# Patient Record
Sex: MALE | Race: WHITE | HISPANIC OR LATINO | Employment: OTHER | URBAN - METROPOLITAN AREA
[De-identification: names, ages, dates, MRNs, and addresses within clinical notes are randomized per-mention and may not be internally consistent; named-entity substitution may affect disease eponyms.]

---

## 2023-03-21 ENCOUNTER — DOCUMENTATION (OUTPATIENT)
Dept: PRIMARY CARE | Facility: CLINIC | Age: 68
End: 2023-03-21
Payer: MEDICARE

## 2023-03-21 ENCOUNTER — TELEPHONE (OUTPATIENT)
Dept: PRIMARY CARE | Facility: CLINIC | Age: 68
End: 2023-03-21
Payer: MEDICARE

## 2023-03-21 ENCOUNTER — PATIENT OUTREACH (OUTPATIENT)
Dept: CARE COORDINATION | Facility: CLINIC | Age: 68
End: 2023-03-21
Payer: MEDICARE

## 2023-03-21 NOTE — PROGRESS NOTES
Discharge Facility:[facility]   Discharge Diagnosis:Other intervertebral disc degeneration, lumbar region,  muscle weakness, unsteadiness on feet, falls  Admission Date:3/8/2023  Discharge Date:3/19/2023    Hospital: [facility] 3/5/2023-3/8/2023    PCP appt:Pending    Engagement  Call Start Time: 1050 (3/21/2023  2:05 PM)    Medications  Medications reviewed with patient/caregiver?: Not applicable (3/21/2023  2:05 PM)  Is the patient having any side effects they believe may be caused by any medication additions or changes?: No (3/21/2023  2:05 PM)  Does the patient have all medications ordered at discharge?: Not applicable (3/21/2023  2:05 PM)  Care Management Interventions: No intervention needed (3/21/2023  2:05 PM)  Is the patient taking all medications as directed (includes completed medication regime)?: Yes (3/21/2023  2:05 PM)    Appointments  Does the patient have a primary care provider?: Yes (3/21/2023  2:05 PM)  Care Management Interventions: Advised patient to make appointment (Wife prefers to schedule, needing multiple appointments) (3/21/2023  2:05 PM)  Has the patient kept scheduled appointments due by today?: Yes (3/21/2023  2:05 PM)    Self Management  What is the home health agency?: [facility] (3/21/2023  2:05 PM)  Has home health visited the patient within 72 hours of discharge?: Call prior to 72 hours (OT Coming today) (3/21/2023  2:05 PM)    Patient Teaching  Does the patient have access to their discharge instructions?: Yes (3/21/2023  2:05 PM)  What is the patient's perception of their health status since discharge?: Improving (Spoke with wife Berta who states Jabari is doing great! Made a sandwich and did some laundry yesterday. No problems with gait or falls since home.) (3/21/2023  2:05 PM)  Is the patient/caregiver able to teach back the hierarchy of who to call/visit for symptoms/problems? PCP, Specialist, Home Health nurse, Urgent Care, ED, 911: Yes (3/21/2023  2:05  PM)    Wrap Up  Call End Time: 1054 (3/21/2023  2:05 PM)

## 2023-03-21 NOTE — TELEPHONE ENCOUNTER
Eliana from [facility] is calling for verbal order for skilled nursing, pt and ot - please call and advise [phone]

## 2023-03-25 PROBLEM — Z99.89 WALKER AS AMBULATION AID: Status: ACTIVE | Noted: 2023-03-25

## 2023-03-25 PROBLEM — R25.2 SPASTICITY: Status: ACTIVE | Noted: 2023-03-25

## 2023-03-25 PROBLEM — R10.11 ABDOMINAL PAIN, RUQ (RIGHT UPPER QUADRANT): Status: ACTIVE | Noted: 2023-03-25

## 2023-03-25 PROBLEM — E55.9 VITAMIN D DEFICIENCY: Status: ACTIVE | Noted: 2023-03-25

## 2023-03-25 PROBLEM — R09.89 BRUIT: Status: ACTIVE | Noted: 2023-03-25

## 2023-03-25 PROBLEM — R19.5 LOOSE BOWEL MOVEMENTS: Status: ACTIVE | Noted: 2023-03-25

## 2023-03-25 PROBLEM — L03.115 CELLULITIS OF RIGHT LOWER EXTREMITY: Status: ACTIVE | Noted: 2023-03-25

## 2023-03-25 PROBLEM — N28.1 RENAL CYST: Status: ACTIVE | Noted: 2023-03-25

## 2023-03-25 PROBLEM — N20.0 NEPHROLITHIASIS: Status: ACTIVE | Noted: 2023-03-25

## 2023-03-25 PROBLEM — E29.1 HYPOGONADISM MALE: Status: ACTIVE | Noted: 2023-03-25

## 2023-03-25 PROBLEM — R93.89 ABNORMAL CT SCAN: Status: ACTIVE | Noted: 2023-03-25

## 2023-03-25 PROBLEM — B35.3 TINEA PEDIS: Status: ACTIVE | Noted: 2023-03-25

## 2023-03-25 PROBLEM — K63.5 COLON POLYPS: Status: ACTIVE | Noted: 2023-03-25

## 2023-03-25 PROBLEM — J31.0 CHRONIC RHINITIS: Status: ACTIVE | Noted: 2023-03-25

## 2023-03-25 PROBLEM — R93.89 ABNORMAL CHEST XRAY: Status: ACTIVE | Noted: 2023-03-25

## 2023-03-25 PROBLEM — G20.A1 PARKINSON'S DISEASE (MULTI): Status: ACTIVE | Noted: 2023-03-25

## 2023-03-25 PROBLEM — E78.5 DYSLIPIDEMIA: Status: ACTIVE | Noted: 2023-03-25

## 2023-03-25 PROBLEM — R32 URINARY INCONTINENCE: Status: ACTIVE | Noted: 2023-03-25

## 2023-03-25 PROBLEM — R39.15 URINARY URGENCY: Status: ACTIVE | Noted: 2023-03-25

## 2023-03-25 PROBLEM — N40.0 BENIGN ENLARGEMENT OF PROSTATE: Status: ACTIVE | Noted: 2023-03-25

## 2023-03-25 PROBLEM — I89.0 LYMPHEDEMA: Status: ACTIVE | Noted: 2023-03-25

## 2023-03-25 PROBLEM — J45.909 REACTIVE AIRWAY DISEASE (HHS-HCC): Status: ACTIVE | Noted: 2023-03-25

## 2023-03-25 PROBLEM — I10 ESSENTIAL HYPERTENSION WITH GOAL BLOOD PRESSURE LESS THAN 140/90: Status: ACTIVE | Noted: 2023-03-25

## 2023-03-25 PROBLEM — I48.0 PAROXYSMAL ATRIAL FIBRILLATION (MULTI): Status: ACTIVE | Noted: 2023-03-25

## 2023-03-25 PROBLEM — R73.03 PREDIABETES: Status: ACTIVE | Noted: 2023-03-25

## 2023-03-25 PROBLEM — R60.0 EDEMA OF UPPER EXTREMITY: Status: ACTIVE | Noted: 2023-03-25

## 2023-03-25 PROBLEM — Z97.3 WEARS GLASSES: Status: ACTIVE | Noted: 2023-03-25

## 2023-03-25 PROBLEM — L30.9 DERMATITIS: Status: ACTIVE | Noted: 2023-03-25

## 2023-03-25 PROBLEM — E03.9 HYPOTHYROIDISM: Status: ACTIVE | Noted: 2023-03-25

## 2023-03-25 PROBLEM — R20.0 LEFT UPPER EXTREMITY NUMBNESS: Status: ACTIVE | Noted: 2023-03-25

## 2023-03-25 PROBLEM — I25.10 ATHEROSCLEROSIS OF CORONARY ARTERY OF NATIVE HEART: Status: ACTIVE | Noted: 2023-03-25

## 2023-03-25 PROBLEM — E78.5 HYPERLIPIDEMIA: Status: ACTIVE | Noted: 2023-03-25

## 2023-03-25 PROBLEM — R73.01 ELEVATED FASTING GLUCOSE: Status: ACTIVE | Noted: 2023-03-25

## 2023-03-25 PROBLEM — I87.329 STASIS EDEMA WITH INFLAMMATION: Status: ACTIVE | Noted: 2023-03-25

## 2023-03-25 PROBLEM — N18.9 CHRONIC KIDNEY DISEASE: Status: ACTIVE | Noted: 2023-03-25

## 2023-03-25 PROBLEM — J32.9 SINUSITIS: Status: ACTIVE | Noted: 2023-03-25

## 2023-03-25 PROBLEM — M79.609: Status: ACTIVE | Noted: 2023-03-25

## 2023-03-25 PROBLEM — R53.83 FATIGUE: Status: ACTIVE | Noted: 2023-03-25

## 2023-03-25 PROBLEM — R21 SKIN RASH: Status: ACTIVE | Noted: 2023-03-25

## 2023-03-25 PROBLEM — M79.89: Status: ACTIVE | Noted: 2023-03-25

## 2023-03-25 PROBLEM — G54.0 THORACIC OUTLET SYNDROME: Status: ACTIVE | Noted: 2023-03-25

## 2023-03-25 PROBLEM — G81.91 HEMIPARESIS, RIGHT (MULTI): Status: ACTIVE | Noted: 2023-03-25

## 2023-03-25 PROBLEM — R26.0 ATAXIC GAIT: Status: ACTIVE | Noted: 2023-03-25

## 2023-03-25 PROBLEM — M25.519 SHOULDER PAIN: Status: ACTIVE | Noted: 2023-03-25

## 2023-03-25 PROBLEM — E66.3 OVERWEIGHT: Status: ACTIVE | Noted: 2023-03-25

## 2023-03-25 RX ORDER — FUROSEMIDE 20 MG/1
1 TABLET ORAL
COMMUNITY
Start: 2015-12-16 | End: 2023-03-31 | Stop reason: DRUGHIGH

## 2023-03-25 RX ORDER — ERGOCALCIFEROL 1.25 MG/1
50000 CAPSULE ORAL
COMMUNITY
Start: 2011-09-24 | End: 2023-11-27 | Stop reason: SDUPTHER

## 2023-03-25 RX ORDER — FLUOCINOLONE ACETONIDE 0.11 MG/ML
OIL TOPICAL
COMMUNITY
Start: 2021-06-22

## 2023-03-25 RX ORDER — METOPROLOL SUCCINATE 25 MG/1
12.5 TABLET, EXTENDED RELEASE ORAL EVERY MORNING
COMMUNITY
Start: 2021-10-07 | End: 2024-01-05 | Stop reason: ALTCHOICE

## 2023-03-25 RX ORDER — FOLIC ACID 1 MG/1
1 TABLET ORAL DAILY
COMMUNITY
Start: 2018-11-12 | End: 2023-09-14

## 2023-03-25 RX ORDER — PRAVASTATIN SODIUM 20 MG/1
20 TABLET ORAL EVERY EVENING
COMMUNITY
Start: 2013-02-14 | End: 2023-09-29 | Stop reason: SDUPTHER

## 2023-03-25 RX ORDER — APIXABAN 5 MG/1
5 TABLET, FILM COATED ORAL 2 TIMES DAILY
COMMUNITY

## 2023-03-25 RX ORDER — TAMSULOSIN HYDROCHLORIDE 0.4 MG/1
0.4 CAPSULE ORAL EVERY EVENING
COMMUNITY
Start: 2019-08-27 | End: 2023-09-15 | Stop reason: SDUPTHER

## 2023-03-25 RX ORDER — CETIRIZINE HYDROCHLORIDE 10 MG/1
10 TABLET ORAL DAILY
COMMUNITY
Start: 2020-07-08 | End: 2024-01-05 | Stop reason: DRUGHIGH

## 2023-03-25 RX ORDER — FLUTICASONE PROPIONATE 50 MCG
2 SPRAY, SUSPENSION (ML) NASAL EVERY EVENING
COMMUNITY
Start: 2020-07-08 | End: 2024-01-05 | Stop reason: DRUGHIGH

## 2023-03-25 RX ORDER — LEVOTHYROXINE SODIUM 25 UG/1
TABLET ORAL
COMMUNITY
Start: 2013-07-15 | End: 2023-06-12 | Stop reason: SDUPTHER

## 2023-03-25 RX ORDER — LOSARTAN POTASSIUM 50 MG/1
50 TABLET ORAL 2 TIMES DAILY
COMMUNITY
Start: 2015-03-03 | End: 2023-09-15 | Stop reason: SDUPTHER

## 2023-03-25 RX ORDER — AMLODIPINE BESYLATE 10 MG/1
1 TABLET ORAL DAILY
COMMUNITY
Start: 2018-11-13 | End: 2024-01-05 | Stop reason: SDUPTHER

## 2023-03-25 RX ORDER — CLOBETASOL PROPIONATE 0.05 G/100ML
SHAMPOO TOPICAL
COMMUNITY

## 2023-03-25 RX ORDER — CARBIDOPA AND LEVODOPA 25; 100 MG/1; MG/1
2 TABLET ORAL 3 TIMES DAILY
COMMUNITY
Start: 2013-02-21 | End: 2023-09-15 | Stop reason: SDUPTHER

## 2023-03-25 RX ORDER — UBIDECARENONE 75 MG
1 CAPSULE ORAL DAILY
COMMUNITY
Start: 2018-11-12

## 2023-03-30 ENCOUNTER — APPOINTMENT (OUTPATIENT)
Dept: PRIMARY CARE | Facility: CLINIC | Age: 68
End: 2023-03-30
Payer: MEDICARE

## 2023-03-31 ENCOUNTER — OFFICE VISIT (OUTPATIENT)
Dept: PRIMARY CARE | Facility: CLINIC | Age: 68
End: 2023-03-31
Payer: MEDICARE

## 2023-03-31 VITALS
RESPIRATION RATE: 16 BRPM | HEART RATE: 55 BPM | TEMPERATURE: 98.5 F | DIASTOLIC BLOOD PRESSURE: 71 MMHG | OXYGEN SATURATION: 96 % | SYSTOLIC BLOOD PRESSURE: 150 MMHG

## 2023-03-31 DIAGNOSIS — Z89.432 STATUS POST AMPUTATION OF LEFT FOOT THROUGH METATARSAL BONE (MULTI): Primary | ICD-10-CM

## 2023-03-31 DIAGNOSIS — I73.9 PVD (PERIPHERAL VASCULAR DISEASE) (CMS-HCC): ICD-10-CM

## 2023-03-31 DIAGNOSIS — G81.91 HEMIPARESIS, RIGHT (MULTI): ICD-10-CM

## 2023-03-31 DIAGNOSIS — R60.9 EDEMA, UNSPECIFIED TYPE: ICD-10-CM

## 2023-03-31 DIAGNOSIS — I48.0 PAROXYSMAL ATRIAL FIBRILLATION (MULTI): ICD-10-CM

## 2023-03-31 LAB
ALBUMIN (G/DL) IN SER/PLAS: 4 G/DL (ref 3.4–5)
ANION GAP IN SER/PLAS: 10 MMOL/L (ref 10–20)
APPEARANCE, URINE: CLEAR
BASOPHILS (10*3/UL) IN BLOOD BY AUTOMATED COUNT: 0.03 X10E9/L (ref 0–0.1)
BASOPHILS/100 LEUKOCYTES IN BLOOD BY AUTOMATED COUNT: 0.5 % (ref 0–2)
BILIRUBIN, URINE: NEGATIVE
BLOOD, URINE: NEGATIVE
CALCIDIOL (25 OH VITAMIN D3) (NG/ML) IN SER/PLAS: 26 NG/ML
CALCIUM (MG/DL) IN SER/PLAS: 9.3 MG/DL (ref 8.6–10.3)
CARBON DIOXIDE, TOTAL (MMOL/L) IN SER/PLAS: 28 MMOL/L (ref 21–32)
CHLORIDE (MMOL/L) IN SER/PLAS: 106 MMOL/L (ref 98–107)
COLOR, URINE: YELLOW
CREATININE (MG/DL) IN SER/PLAS: 0.76 MG/DL (ref 0.5–1.3)
CREATININE (MG/DL) IN URINE: 71.2 MG/DL (ref 20–370)
EOSINOPHILS (10*3/UL) IN BLOOD BY AUTOMATED COUNT: 0.18 X10E9/L (ref 0–0.7)
EOSINOPHILS/100 LEUKOCYTES IN BLOOD BY AUTOMATED COUNT: 3.2 % (ref 0–6)
ERYTHROCYTE DISTRIBUTION WIDTH (RATIO) BY AUTOMATED COUNT: 15.1 % (ref 11.5–14.5)
ERYTHROCYTE MEAN CORPUSCULAR HEMOGLOBIN CONCENTRATION (G/DL) BY AUTOMATED: 31.8 G/DL (ref 32–36)
ERYTHROCYTE MEAN CORPUSCULAR VOLUME (FL) BY AUTOMATED COUNT: 88 FL (ref 80–100)
ERYTHROCYTES (10*6/UL) IN BLOOD BY AUTOMATED COUNT: 4.58 X10E12/L (ref 4.5–5.9)
GFR MALE: >90 ML/MIN/1.73M2
GLUCOSE (MG/DL) IN SER/PLAS: 96 MG/DL (ref 74–99)
GLUCOSE, URINE: NEGATIVE MG/DL
HEMATOCRIT (%) IN BLOOD BY AUTOMATED COUNT: 40.2 % (ref 41–52)
HEMOGLOBIN (G/DL) IN BLOOD: 12.8 G/DL (ref 13.5–17.5)
IMMATURE GRANULOCYTES/100 LEUKOCYTES IN BLOOD BY AUTOMATED COUNT: 0.2 % (ref 0–0.9)
KETONES, URINE: NEGATIVE MG/DL
LEUKOCYTE ESTERASE, URINE: NEGATIVE
LEUKOCYTES (10*3/UL) IN BLOOD BY AUTOMATED COUNT: 5.7 X10E9/L (ref 4.4–11.3)
LYMPHOCYTES (10*3/UL) IN BLOOD BY AUTOMATED COUNT: 1.37 X10E9/L (ref 1.2–4.8)
LYMPHOCYTES/100 LEUKOCYTES IN BLOOD BY AUTOMATED COUNT: 24.1 % (ref 13–44)
MAGNESIUM (MG/DL) IN SER/PLAS: 2.12 MG/DL (ref 1.6–2.4)
MONOCYTES (10*3/UL) IN BLOOD BY AUTOMATED COUNT: 0.47 X10E9/L (ref 0.1–1)
MONOCYTES/100 LEUKOCYTES IN BLOOD BY AUTOMATED COUNT: 8.3 % (ref 2–10)
MUCUS, URINE: NORMAL /LPF
NEUTROPHILS (10*3/UL) IN BLOOD BY AUTOMATED COUNT: 3.62 X10E9/L (ref 1.2–7.7)
NEUTROPHILS/100 LEUKOCYTES IN BLOOD BY AUTOMATED COUNT: 63.7 % (ref 40–80)
NITRITE, URINE: NEGATIVE
PH, URINE: 7 (ref 5–8)
PHOSPHATE (MG/DL) IN SER/PLAS: 2.9 MG/DL (ref 2.5–4.9)
PLATELETS (10*3/UL) IN BLOOD AUTOMATED COUNT: 177 X10E9/L (ref 150–450)
POTASSIUM (MMOL/L) IN SER/PLAS: 4.1 MMOL/L (ref 3.5–5.3)
PROTEIN (MG/DL) IN URINE: 11 MG/DL (ref 5–25)
PROTEIN, URINE: NEGATIVE MG/DL
PROTEIN/CREATININE (MG/MG) IN URINE: 0.15 MG/MG CREAT (ref 0–0.17)
RBC, URINE: 1 /HPF (ref 0–5)
SODIUM (MMOL/L) IN SER/PLAS: 140 MMOL/L (ref 136–145)
SPECIFIC GRAVITY, URINE: 1.01 (ref 1–1.03)
SQUAMOUS EPITHELIAL CELLS, URINE: <1 /HPF
URATE (MG/DL) IN SER/PLAS: 6.2 MG/DL (ref 4–7.5)
UREA NITROGEN (MG/DL) IN SER/PLAS: 16 MG/DL (ref 6–23)
UROBILINOGEN, URINE: <2 MG/DL (ref 0–1.9)
WBC, URINE: 2 /HPF (ref 0–5)

## 2023-03-31 PROCEDURE — 1159F MED LIST DOCD IN RCRD: CPT | Performed by: INTERNAL MEDICINE

## 2023-03-31 PROCEDURE — 99214 OFFICE O/P EST MOD 30 MIN: CPT | Performed by: INTERNAL MEDICINE

## 2023-03-31 PROCEDURE — 3077F SYST BP >= 140 MM HG: CPT | Performed by: INTERNAL MEDICINE

## 2023-03-31 PROCEDURE — 3078F DIAST BP <80 MM HG: CPT | Performed by: INTERNAL MEDICINE

## 2023-03-31 PROCEDURE — 1036F TOBACCO NON-USER: CPT | Performed by: INTERNAL MEDICINE

## 2023-03-31 RX ORDER — FUROSEMIDE 20 MG/1
20 TABLET ORAL 2 TIMES DAILY
Qty: 180 TABLET | Refills: 3 | Status: SHIPPED | OUTPATIENT
Start: 2023-03-31 | End: 2023-09-07 | Stop reason: SDUPTHER

## 2023-03-31 RX ORDER — FUROSEMIDE 20 MG/1
20 TABLET ORAL 2 TIMES DAILY
Qty: 180 TABLET | Refills: 3 | Status: SHIPPED | OUTPATIENT
Start: 2023-03-31 | End: 2023-03-31 | Stop reason: SDUPTHER

## 2023-03-31 ASSESSMENT — ENCOUNTER SYMPTOMS
LOSS OF SENSATION IN FEET: 1
OCCASIONAL FEELINGS OF UNSTEADINESS: 1
DEPRESSION: 0

## 2023-03-31 ASSESSMENT — PATIENT HEALTH QUESTIONNAIRE - PHQ9
2. FEELING DOWN, DEPRESSED OR HOPELESS: NOT AT ALL
1. LITTLE INTEREST OR PLEASURE IN DOING THINGS: NOT AT ALL
SUM OF ALL RESPONSES TO PHQ9 QUESTIONS 1 AND 2: 0

## 2023-03-31 NOTE — PROGRESS NOTES
Subjective   Patient ID: Jabari Swan is a 68 y.o. male who presents for Hospital Follow-up.    Here with his wife Berta after a recent ER visit 3/5/2023 he was admitted for 3 days and then discharged to rehab March 8, until March 19.  He has been home since then    Overall doing well all things considered.  No falls.  He is doing exercises at home         Review of Systems    Objective   /71 (BP Location: Left arm, Patient Position: Sitting)   Pulse 55   Temp 36.9 °C (98.5 °F)   Resp 16   SpO2 96%     Physical Exam  Constitutional:       Appearance: He is obese.      Comments: Very pleasant gentleman, in a wheelchair no distress, right hemiparesis   HENT:      Head: Normocephalic and atraumatic.   Eyes:      Extraocular Movements: Extraocular movements intact.      Conjunctiva/sclera: Conjunctivae normal.      Pupils: Pupils are equal, round, and reactive to light.   Cardiovascular:      Rate and Rhythm: Normal rate and regular rhythm.      Pulses: Normal pulses.      Heart sounds: Normal heart sounds.      No gallop.   Pulmonary:      Effort: Pulmonary effort is normal.      Breath sounds: Normal breath sounds. No wheezing or rhonchi.   Musculoskeletal:      Cervical back: Normal range of motion and neck supple. No tenderness.      Right lower leg: Edema present.      Left lower leg: Edema present.   Lymphadenopathy:      Cervical: No cervical adenopathy.   Neurological:      Mental Status: He is alert.      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  At home again.  He will continue ongoing home exercises and therapy as needed fortunately improved     P. A-Fib/Arrhythmia - cardiologist, Dr. Foley has started metoprolol 25 mg one time a day. No dizziness.  Stable clinically from a rhythm standpoint. He will continue his medications and cardiology follow-up            Stable, saw Dr. Foley Nov '22.     Pulmonary hypertension - echo April 2022 did show mild to moderate pulmonary hypertension but his cardiologist, did not feel that this was an issue. Otherwise normal EF without worrisome valvular findings on that echo     Bradycardia - discussed with wife in detail-although his heart rate gets into the high 40 range sometime he is not symptomatic. They reviewed this with Dr. Carnes who suggested reducing metoprolol succinate 25-1/2 tablet daily. Pulse rate has increased from the high 40s up into the 50 range. Recently between 55-64     Hypertension/chronic kidney disease-he will continue medications and nephrology follow-up with Dr. Sahu's successor, Dr. Contreras. He'll see her semiannually.             They will follow-up with Dr. Contreras semiannually-September and March.     Stasis dermatitis longstanding lymphedema/stasis cellulitis concerns - I think presently there is no signs of cellulitis today nor are there any open ulcers on the right calf. They will use the dressing during the day with the compression hose in place but then leave this off at night. They will discontinue the mupirocin with concerns of possible contact dermatitis as they have had issues with other antibiotic ointments and adhesive tapes   Improved presently. No recent concerns. He is on a stronger compression-he is able to put his socks on himself each morning. Uses  triamcinolone cream and CeraVe -as recommended by the dermatologist, Dr. Villalobos who they will see as needed        Edema - stable, compression hose daily, also uses a pump in the evening, continue Lasix as directed - this was recently decreased from twice daily by nephrology. Patient encouraged to get a scale so that he can monitor weight at home and keep log.    His wife states that his nephrologist suggested reducing the furosemide a bit-they will take 1 tablet Monday Wednesday Friday, 2 tablets on the other days and start doing weights at home     Parkinson's - not problematic, without noticeable tremor with medications per Dr. Peters in neurology- just saw last month; cont. semiannual visits. Overall doing well from this standpoint. To see Dr. Peters semiannually. Tremor not obvious today and exam. Doing well presently. semiannual visits planned. Tolerating medications. No recent concerns stable course.   He will see Dr. Lyle annually -each October. Clinically stable     Remote intracranial bleed/right-sided hemiparesis/gait unsteadiness - stable course. He's functionally independent for the most part at home. His wife continues to be extremely supportive accordingly. He remained stable fortunately   He will continue caution ambulating and continue exercises and stretches to maintain strength stamina as well as ambulation conditioning. He uses a walker at home typically fortunately no recent falls. Physical therapy continues on an as-needed basis     Hypothyroidism - we'll continue to follow thyroid labs semi-annually   Sept '22 TSH normal, continue present regimen.     Prediabetes - monitoring on semiannual basis   Jan '22, A1c 6.1%   Sept '22, A1c 6.1%, stable from prior, discussed weight loss efforts today.      Dyslipidemia/elevated weight - he will continue medications accordingly and weight loss efforts. Fasting labs will be followed at least annually   July 2021 lipids improved   Sept 2022 lipids slightly  "elevated but overall stable. Health lifestyle measures encouraged. He will continue to follow-up with cardiology (Dr. Foley) accordingly.     History of Recurrent pneumonia/with hospitalization again with another rehabilitation stay-    Improved presently no shortness of breath. Occas cough. Using incentive spirometer a 3 times daily        History of recurrent Left foot cellulitis /multiple amputations- he will try to keep this open to the air during the day to help keep this dry between the toes. He will see his podiatrist, Dr. Thurston as planned, no recent problems with consistent care     Urinary incontinence/BPH/nephrolithiasis/hypogonadism will continue medications per urology - He follows with Dr. Srivastava in urology--    Vitamin D deficiency - encouraged patient to continue vitamin D daily as ordered. Will consider vitamin D level regularly.   Sept '22 vitamin D stable/normal.     Colon care/colon polyps - as above colonoscopy planned routinely at some point with Dr. Camacho off the Eliquis   He had multiple tubular adenomatous polyps on his 12/21 colonoscopy. Next in 3 years-December 2024.     Foot care/status post right toe amputations - there is no evidence of any left toe infections today. He will continue with Dr. Groves in podiatry every 6 weeks. Appt yesterday \"everything fine\"    He will see Dr. Groves every 3 months or so-Aquaphor is used-Next appointment January 2023        Skin care/scalp psoriasis/eczema - per Dr. Amaya annually or as needed. UTD.      Dental care - continue every 6 months     Vision care/bilateral cataract/glasses - annual visits continue. He will see Dr. Riley August 2021 to address his bilateral cataracts.           At this point the cataracts are not ready to be removed. They will continue to see her regularly        Flu shot - each fall after Labor Day encouraged    Follow-up in 4 months, sooner as needed  "

## 2023-04-20 ENCOUNTER — DOCUMENTATION (OUTPATIENT)
Dept: PRIMARY CARE | Facility: CLINIC | Age: 68
End: 2023-04-20
Payer: MEDICARE

## 2023-04-27 ENCOUNTER — TELEPHONE (OUTPATIENT)
Dept: PRIMARY CARE | Facility: CLINIC | Age: 68
End: 2023-04-27
Payer: MEDICARE

## 2023-04-27 NOTE — TELEPHONE ENCOUNTER
Tabitha/[employer]/There are two outstanding orders that need to be signed off on, so patient can get them billed out to insurance. Patient has two outstanding orders for: Frequency change order and Face to Face verification. Please advise if these can be signed off today at: Tabitha/PH: [phone].

## 2023-05-01 NOTE — TELEPHONE ENCOUNTER
Called today to advise the orders have been faxed please have Dr. Rivera sign and return today so pt doesn't get a bill

## 2023-06-12 DIAGNOSIS — E03.9 ACQUIRED HYPOTHYROIDISM: Primary | ICD-10-CM

## 2023-06-12 RX ORDER — LEVOTHYROXINE SODIUM 25 UG/1
25 TABLET ORAL
Qty: 90 TABLET | Refills: 1 | Status: SHIPPED | OUTPATIENT
Start: 2023-06-12 | End: 2023-09-15 | Stop reason: SDUPTHER

## 2023-07-10 ENCOUNTER — TELEPHONE (OUTPATIENT)
Dept: PRIMARY CARE | Facility: CLINIC | Age: 68
End: 2023-07-10
Payer: MEDICARE

## 2023-07-10 NOTE — TELEPHONE ENCOUNTER
Patient's wife called / states  was seen at [place] ED on 7/7 for cellulitis. Wife states he needs an appt with Dr Nicole lopez.   Advised that I would send message to provider.     Please advise.....

## 2023-07-10 NOTE — TELEPHONE ENCOUNTER
Spoke with patient's spouse regarding his leg swelling, chronic problem and the redness.  He was started on Keflex Friday in the ER and she states that it may be a bit better.  Discussed his tendency towards swelling, a long-term issue.  He did see Dr. Gandhi years ago in the vascular clinic.  He has since retired.  I suggested he call the vascular clinic to set up an appointment with Dr. Gandhi successor to see if any vein testing or treatment needs to be considered to prevent this chronic stasis issue from recurring which leads to chronic stasis dermatitis and sometimes stasis cellulitis.    They will follow-up with me later this month as scheduled on July 27

## 2023-07-27 ENCOUNTER — OFFICE VISIT (OUTPATIENT)
Dept: PRIMARY CARE | Facility: CLINIC | Age: 68
End: 2023-07-27
Payer: MEDICARE

## 2023-07-27 VITALS
OXYGEN SATURATION: 94 % | DIASTOLIC BLOOD PRESSURE: 69 MMHG | TEMPERATURE: 98.1 F | HEART RATE: 57 BPM | RESPIRATION RATE: 16 BRPM | SYSTOLIC BLOOD PRESSURE: 129 MMHG

## 2023-07-27 DIAGNOSIS — I89.0 LYMPHEDEMA: ICD-10-CM

## 2023-07-27 DIAGNOSIS — I87.2 VENOUS INSUFFICIENCY (CHRONIC) (PERIPHERAL): ICD-10-CM

## 2023-07-27 DIAGNOSIS — E78.5 DYSLIPIDEMIA: ICD-10-CM

## 2023-07-27 DIAGNOSIS — R73.03 PREDIABETES: Primary | ICD-10-CM

## 2023-07-27 DIAGNOSIS — I10 ESSENTIAL HYPERTENSION WITH GOAL BLOOD PRESSURE LESS THAN 140/90: ICD-10-CM

## 2023-07-27 DIAGNOSIS — D12.6 ADENOMATOUS POLYP OF COLON, UNSPECIFIED PART OF COLON: ICD-10-CM

## 2023-07-27 DIAGNOSIS — I73.9 PVD (PERIPHERAL VASCULAR DISEASE) (CMS-HCC): ICD-10-CM

## 2023-07-27 DIAGNOSIS — E03.9 ACQUIRED HYPOTHYROIDISM: ICD-10-CM

## 2023-07-27 PROBLEM — S30.1XXA PSOAS HEMATOMA, RIGHT, SECONDARY TO ANTICOAGULANT THERAPY: Status: ACTIVE | Noted: 2018-11-28

## 2023-07-27 PROBLEM — E44.1 MALNUTRITION OF MILD DEGREE (MULTI): Status: ACTIVE | Noted: 2018-11-30

## 2023-07-27 PROBLEM — R29.90 STROKE-LIKE SYMPTOMS: Status: ACTIVE | Noted: 2022-04-13

## 2023-07-27 PROBLEM — M53.9 ACUTE LOW BACK PAIN DUE TO SPINAL DISORDER: Status: ACTIVE | Noted: 2018-11-22

## 2023-07-27 PROBLEM — J06.9 VIRAL URI: Status: ACTIVE | Noted: 2021-09-11

## 2023-07-27 PROBLEM — J18.9 PNEUMONIA: Status: ACTIVE | Noted: 2017-12-27

## 2023-07-27 PROBLEM — E44.1 MALNUTRITION OF MILD DEGREE (MULTI): Status: RESOLVED | Noted: 2018-11-30 | Resolved: 2023-07-27

## 2023-07-27 PROBLEM — S32.009A CLOSED FRACTURE OF LUMBAR VERTEBRA (MULTI): Status: ACTIVE | Noted: 2018-11-28

## 2023-07-27 PROBLEM — M54.50 ACUTE LOW BACK PAIN DUE TO SPINAL DISORDER: Status: ACTIVE | Noted: 2018-11-22

## 2023-07-27 PROBLEM — R19.7 DIARRHEA: Status: ACTIVE | Noted: 2021-01-24

## 2023-07-27 PROBLEM — R42 LIGHTHEADEDNESS: Status: ACTIVE | Noted: 2021-01-23

## 2023-07-27 PROCEDURE — 1160F RVW MEDS BY RX/DR IN RCRD: CPT | Performed by: INTERNAL MEDICINE

## 2023-07-27 PROCEDURE — 3078F DIAST BP <80 MM HG: CPT | Performed by: INTERNAL MEDICINE

## 2023-07-27 PROCEDURE — 99214 OFFICE O/P EST MOD 30 MIN: CPT | Performed by: INTERNAL MEDICINE

## 2023-07-27 PROCEDURE — 3074F SYST BP LT 130 MM HG: CPT | Performed by: INTERNAL MEDICINE

## 2023-07-27 PROCEDURE — 1159F MED LIST DOCD IN RCRD: CPT | Performed by: INTERNAL MEDICINE

## 2023-07-27 PROCEDURE — 1036F TOBACCO NON-USER: CPT | Performed by: INTERNAL MEDICINE

## 2023-07-27 RX ORDER — METOPROLOL TARTRATE 25 MG/1
TABLET, FILM COATED ORAL EVERY 24 HOURS
COMMUNITY
End: 2023-07-27 | Stop reason: ALTCHOICE

## 2023-07-27 RX ORDER — OXYBUTYNIN CHLORIDE 10 MG/1
TABLET, EXTENDED RELEASE ORAL EVERY 24 HOURS
COMMUNITY
End: 2023-07-27 | Stop reason: ALTCHOICE

## 2023-07-27 RX ORDER — HYDRALAZINE HYDROCHLORIDE 25 MG/1
TABLET, FILM COATED ORAL
COMMUNITY
End: 2023-07-27 | Stop reason: ALTCHOICE

## 2023-07-27 RX ORDER — TRIAMCINOLONE ACETONIDE 1 MG/G
CREAM TOPICAL 2 TIMES DAILY
COMMUNITY
End: 2024-03-11 | Stop reason: SDUPTHER

## 2023-07-27 RX ORDER — MUPIROCIN 20 MG/G
1 OINTMENT TOPICAL 3 TIMES DAILY
COMMUNITY
End: 2023-07-27 | Stop reason: ALTCHOICE

## 2023-07-27 RX ORDER — CEPHALEXIN 500 MG/1
CAPSULE ORAL
COMMUNITY
Start: 2023-07-07 | End: 2023-07-27 | Stop reason: ALTCHOICE

## 2023-07-27 RX ORDER — MIRABEGRON 50 MG/1
TABLET, EXTENDED RELEASE ORAL EVERY 24 HOURS
COMMUNITY
End: 2023-07-27 | Stop reason: ALTCHOICE

## 2023-07-27 RX ORDER — TACROLIMUS 1 MG/G
OINTMENT TOPICAL
COMMUNITY
Start: 2023-07-21 | End: 2023-07-27 | Stop reason: ALTCHOICE

## 2023-07-27 RX ORDER — CARVEDILOL 25 MG/1
TABLET ORAL EVERY 12 HOURS
COMMUNITY
End: 2023-07-27 | Stop reason: ALTCHOICE

## 2023-07-27 RX ORDER — SPIRONOLACTONE 50 MG/1
TABLET, FILM COATED ORAL EVERY 24 HOURS
COMMUNITY
Start: 2015-02-24 | End: 2023-07-27 | Stop reason: ALTCHOICE

## 2023-07-27 RX ORDER — CARBIDOPA 25 MG/1
TABLET ORAL
COMMUNITY
End: 2023-07-27 | Stop reason: ALTCHOICE

## 2023-07-27 ASSESSMENT — ENCOUNTER SYMPTOMS
OCCASIONAL FEELINGS OF UNSTEADINESS: 1
LOSS OF SENSATION IN FEET: 1
DEPRESSION: 0

## 2023-07-27 NOTE — PROGRESS NOTES
Subjective   Patient ID: Jabari Swan is a 68 y.o. male who presents for Follow-up.    Here for follow-up with his wife for most part doing well.    He was in the ER earlier in July on July 7.  There was some concern of cellulitis-he was placed on Keflex he has since discontinued    He was seen by his vascular surgeon Dr. Mae.  Right leg ultrasound planned tomorrow         Review of Systems    Objective   /69 (BP Location: Left arm, Patient Position: Sitting, BP Cuff Size: Adult)   Pulse 57   Temp 36.7 °C (98.1 °F)   Resp 16   SpO2 94%     Physical Exam    Well-developed white male, alert and appropriate at baseline quite pleasant in a wheelchair, overweight  Eye exam revealed pupils equally round and reactive, with extraocular muscles intact. Normal sclera and eyelids.  Neck exam revealed no masses, adenopathy or thyromegaly. No neck pain on range of motion was noted.  Pulmonary exam revealed clear breath sounds bilaterally in all lung fields. No wheezes bronchitis or rales noted.  Cardiac exam revealed regular rate and rhythm without murmurs gallops or rubs.  No back flank or abdominal discomfort  Extremities bilateral compression hose-exam deferred  Neurologic exam at baseline-without any obvious cranial nerve deficits.  Upper extremity motor and sensory exam intact bilaterally  Gait not tested as he was in a wheelchair  Mental status was at baseline-without anxiousness or evidence of depression    Assessment/Plan   Problem List Items Addressed This Visit       Colon polyps    Dyslipidemia    Essential hypertension with goal blood pressure less than 140/90    Hypothyroidism    Relevant Orders    TSH with reflex to Free T4 if abnormal    Lymphedema    Prediabetes - Primary    Relevant Orders    Hemoglobin A1C    PVD (peripheral vascular disease) (CMS/HCC)    Venous insufficiency (chronic) (peripheral)        Care planning-his wife is extraordinarily helpful-she was here today during the visit    S/p ER  visit 7/7/2023 for right leg cellulitis concern-the ER placed him on Keflex.  He saw vascular surgery-stasis congestion suspected-venous ultrasound has been ordered-plan for tomorrow    S/p  hospitalization-with acute stay March 5 through 8, followed by rehab March 8 to 19.  At home again.  He will continue ongoing home exercises and therapy as needed fortunately improved     P. A-Fib/Arrhythmia - cardiologist, Dr. Foley has started metoprolol 25 mg one time a day. No dizziness.  Stable clinically from a rhythm standpoint. He will continue his medications and cardiology follow-up            Stable, saw Dr. Foley Nov '22. Next appt Nov '23     Pulmonary hypertension - echo April 2022 did show mild to moderate pulmonary hypertension but his cardiologist, did not feel that this was an issue. Otherwise normal EF without worrisome valvular findings on that echo     Bradycardia - discussed with wife in detail-although his heart rate gets into the high 40 range sometime he is not symptomatic. They reviewed this with Dr. Carnes who suggested reducing metoprolol succinate 25-1/2 tablet daily. Pulse rate has increased from the high 40s up into the 50 range. Recently between 55-64           Pulse 57 today     Hypertension/chronic kidney disease-he will continue medications and nephrology follow-up with Dr. Sahu's successor, Dr. Contreras. He'll see her semiannually.             They will follow-up with Dr. Gutierrez semiannually-Apr & Oct     Stasis dermatitis longstanding lymphedema/stasis cellulitis concerns -               Improved presently. He is on a stronger compression-he is able to put his socks on himself each morning. Uses triamcinolone cream and CeraVe -as recommended by the dermatologist, Dr. Villalobos who they will see as needed           Vascular surgery reevaluation 7/23 after ER visit.  Ultrasound pending        Edema - stable, compression hose daily, also uses a pump in the evening, continue Lasix as directed  - this was recently decreased from twice daily by nephrology. Patient encouraged to get a scale so that he can monitor weight at home and keep log.    His wife states that his nephrologist suggested reducing the furosemide a bit-they will take 1 tablet Monday Wednesday Friday, 2 tablets on the other days and start doing weights at home     Parkinson's - not problematic, without noticeable tremor with medications per Dr. Peters in neurology- just saw last month; cont. semiannual visits. Overall doing well from this standpoint. To see Dr. Peters semiannually. Tremor not obvious today and exam. Doing well presently. semiannual visits planned. Tolerating medications. No recent concerns stable course.   He will see Dr. Lyle annually -each October. Clinically stable    Multiple medications-patient is on multiple medications. Medication list reviewed with the patient and reconciled and updated in the EMR accordingly. Ongoing efforts to minimize the number of medications medications as well as optimize dosing and compliance continues to be an ongoing priority.       Remote intracranial bleed/right-sided hemiparesis/gait unsteadiness - stable course. He's functionally independent for the most part at home. His wife continues to be extremely supportive accordingly. He remained stable fortunately   He will continue caution ambulating and continue exercises and stretches to maintain strength stamina as well as ambulation conditioning. He uses a walker at home typically fortunately no recent falls. Physical therapy continues on an as-needed basis     Hypothyroidism - we'll continue to follow thyroid labs semi-annually   Sept '22 TSH normal, continue present regimen.           TSH this fall     Prediabetes - monitoring on semiannual basis   Jan '22, A1c 6.1%   Sept '22, A1c 6.1%, stable from prior, discussed weight loss efforts today.      Dyslipidemia/elevated weight - he will continue medications accordingly and weight loss  "efforts. Fasting labs will be followed at least annually   July 2021 lipids improved   Sept 2022 lipids slightly elevated but overall stable. Health lifestyle measures encouraged. He will continue to follow-up with cardiology (Dr. Foley) accordingly.     History of Recurrent pneumonia/with hospitalization again with another rehabilitation stay-    Improved presently no shortness of breath. Occas cough. Using incentive spirometer a 3 times daily        History of recurrent Left foot cellulitis /multiple amputations- he will try to keep this open to the air during the day to help keep this dry between the toes. He will see his podiatrist, Dr. Thurston as planned, no recent problems with consistent care     Urinary incontinence/BPH/nephrolithiasis/hypogonadism will continue medications per urology - He follows with Dr. Srivastava in urology--    Vitamin D deficiency - encouraged patient to continue vitamin D daily as ordered. Will consider vitamin D level regularly.   Sept '22 vitamin D stable/normal.     Colon care/colon polyps - as above colonoscopy planned routinely at some point with Dr. Camacho off the Eliquis   He had multiple tubular adenomatous polyps on his 12/21 colonoscopy. Next in 3 years-December 2024.     Foot care/status post right toe amputations - there is no evidence of any left toe infections today. He will continue with Dr. Groves in podiatry every 6 weeks. Appt yesterday \"everything fine\"    He will see Dr. Groves every 3 months or so-Aquaphor is used-Next appointment January 2023        Skin care/scalp psoriasis/eczema - per Dr. Amaya annually or as needed. UTD.      Dental care - continue every 6 months     Elevated IOP / Vision care/bilateral cataract/glasses -  visits continue. He will see Dr. Riley August 2021 to address his bilateral cataracts.           At this point the cataracts are not ready to be removed. They will continue to see her regularly              S/p right laser procedure, left " laser eye planned soon in Aug '23 for pressure concerns.                      Flu shot - each fall after Labor Day encouraged    Follow-up in 4 months, sooner as needed

## 2023-07-28 PROBLEM — R29.90 STROKE-LIKE SYMPTOMS: Status: RESOLVED | Noted: 2022-04-13 | Resolved: 2023-07-28

## 2023-07-28 PROBLEM — L03.115 CELLULITIS OF RIGHT LOWER EXTREMITY: Status: RESOLVED | Noted: 2023-03-25 | Resolved: 2023-07-28

## 2023-07-28 PROBLEM — J06.9 VIRAL URI: Status: RESOLVED | Noted: 2021-09-11 | Resolved: 2023-07-28

## 2023-07-28 PROBLEM — J32.9 SINUSITIS: Status: RESOLVED | Noted: 2023-03-25 | Resolved: 2023-07-28

## 2023-09-06 DIAGNOSIS — R60.9 EDEMA, UNSPECIFIED TYPE: ICD-10-CM

## 2023-09-06 DIAGNOSIS — I73.9 PVD (PERIPHERAL VASCULAR DISEASE) (CMS-HCC): ICD-10-CM

## 2023-09-07 RX ORDER — FUROSEMIDE 20 MG/1
20 TABLET ORAL 2 TIMES DAILY
Qty: 60 TABLET | Refills: 0 | Status: SHIPPED | OUTPATIENT
Start: 2023-09-07 | End: 2023-09-29

## 2023-09-07 NOTE — TELEPHONE ENCOUNTER
Patient of Dr Rivera's. Wife states  needs a refill on furosemide 20 mg Take 1 tablet (20 mg) by mouth in the morning and 1 tablet (20 mg) before bedtime    States he has enough tablets for today only and his mail order pharmacy cannot ship it out until 9/12.     Wants to know if you could send script to [facility].     Please notify wife when send to pharmacy.

## 2023-09-14 DIAGNOSIS — Z00.00 ENCOUNTER FOR GENERAL ADULT MEDICAL EXAMINATION WITHOUT ABNORMAL FINDINGS: ICD-10-CM

## 2023-09-14 DIAGNOSIS — G81.91 HEMIPARESIS, RIGHT (MULTI): Primary | ICD-10-CM

## 2023-09-14 RX ORDER — FOLIC ACID 1 MG/1
1 TABLET ORAL DAILY
Qty: 90 TABLET | Refills: 3 | Status: SHIPPED | OUTPATIENT
Start: 2023-09-14 | End: 2024-04-15 | Stop reason: SDUPTHER

## 2023-09-15 DIAGNOSIS — G20.A1 PARKINSON'S DISEASE (MULTI): ICD-10-CM

## 2023-09-15 DIAGNOSIS — N40.1 BENIGN PROSTATIC HYPERPLASIA WITH NOCTURIA: ICD-10-CM

## 2023-09-15 DIAGNOSIS — R35.1 BENIGN PROSTATIC HYPERPLASIA WITH NOCTURIA: ICD-10-CM

## 2023-09-15 DIAGNOSIS — E03.9 ACQUIRED HYPOTHYROIDISM: ICD-10-CM

## 2023-09-15 DIAGNOSIS — I10 ESSENTIAL HYPERTENSION WITH GOAL BLOOD PRESSURE LESS THAN 140/90: Primary | ICD-10-CM

## 2023-09-15 RX ORDER — LEVOTHYROXINE SODIUM 25 UG/1
25 TABLET ORAL
Qty: 90 TABLET | Refills: 1 | Status: SHIPPED | OUTPATIENT
Start: 2023-09-15 | End: 2024-05-29

## 2023-09-15 RX ORDER — LOSARTAN POTASSIUM 50 MG/1
50 TABLET ORAL 2 TIMES DAILY
Qty: 180 TABLET | Refills: 3 | Status: SHIPPED | OUTPATIENT
Start: 2023-09-15 | End: 2024-01-31 | Stop reason: DRUGHIGH

## 2023-09-15 RX ORDER — TAMSULOSIN HYDROCHLORIDE 0.4 MG/1
0.4 CAPSULE ORAL EVERY EVENING
Qty: 90 CAPSULE | Refills: 3 | Status: SHIPPED | OUTPATIENT
Start: 2023-09-15

## 2023-09-15 RX ORDER — CARBIDOPA AND LEVODOPA 25; 100 MG/1; MG/1
2 TABLET ORAL 3 TIMES DAILY
Qty: 540 TABLET | Refills: 0 | Status: SHIPPED | OUTPATIENT
Start: 2023-09-15 | End: 2023-12-19

## 2023-09-15 NOTE — TELEPHONE ENCOUNTER
Pt of Dr Rivera  [facility] Ph [phone]   Please advise 90 day supply for all meds. Thank you!    Carbidopa-levodopa 25-100mg tab 2tabs 3xs a day  Folic acid 1mg tablet once daily  Flomax 0.4mg caps once daily  Synthroid levothyroxine 25mcg once daily  Losartan 50mg tab one tab twice daily

## 2023-09-29 DIAGNOSIS — E78.5 DYSLIPIDEMIA: Primary | ICD-10-CM

## 2023-09-29 DIAGNOSIS — R60.9 EDEMA, UNSPECIFIED TYPE: ICD-10-CM

## 2023-09-29 DIAGNOSIS — I73.9 PVD (PERIPHERAL VASCULAR DISEASE) (CMS-HCC): ICD-10-CM

## 2023-09-29 RX ORDER — PRAVASTATIN SODIUM 20 MG/1
20 TABLET ORAL EVERY EVENING
Qty: 90 TABLET | Refills: 3 | Status: SHIPPED | OUTPATIENT
Start: 2023-09-29 | End: 2024-09-28

## 2023-09-29 RX ORDER — FUROSEMIDE 20 MG/1
20 TABLET ORAL 2 TIMES DAILY
Qty: 180 TABLET | Refills: 3 | Status: SHIPPED | OUTPATIENT
Start: 2023-09-29 | End: 2023-11-07

## 2023-10-18 ENCOUNTER — CLINICAL SUPPORT (OUTPATIENT)
Dept: PRIMARY CARE | Facility: CLINIC | Age: 68
End: 2023-10-18
Payer: MEDICARE

## 2023-10-18 PROCEDURE — G0008 ADMIN INFLUENZA VIRUS VAC: HCPCS | Performed by: INTERNAL MEDICINE

## 2023-10-18 PROCEDURE — 90662 IIV NO PRSV INCREASED AG IM: CPT | Performed by: INTERNAL MEDICINE

## 2023-10-18 NOTE — PROGRESS NOTES
Patient presents in the office for Flu vaccine.     This drug was administered by Vu Graham MA at 10:19 AM per physician order.    Patient tolerated well.

## 2023-11-07 DIAGNOSIS — I73.9 PVD (PERIPHERAL VASCULAR DISEASE) (CMS-HCC): ICD-10-CM

## 2023-11-07 DIAGNOSIS — R60.9 EDEMA, UNSPECIFIED TYPE: ICD-10-CM

## 2023-11-07 RX ORDER — FUROSEMIDE 20 MG/1
20 TABLET ORAL 2 TIMES DAILY
Qty: 60 TABLET | Refills: 11 | Status: SHIPPED | OUTPATIENT
Start: 2023-11-07

## 2023-11-27 DIAGNOSIS — E55.9 VITAMIN D DEFICIENCY: Primary | ICD-10-CM

## 2023-11-27 RX ORDER — ERGOCALCIFEROL 1.25 MG/1
50000 CAPSULE ORAL
Qty: 6 CAPSULE | Refills: 3 | Status: SHIPPED | OUTPATIENT
Start: 2023-11-27 | End: 2024-11-26

## 2023-12-11 ENCOUNTER — TELEPHONE (OUTPATIENT)
Dept: PRIMARY CARE | Facility: CLINIC | Age: 68
End: 2023-12-11
Payer: MEDICARE

## 2023-12-12 ENCOUNTER — PATIENT OUTREACH (OUTPATIENT)
Dept: CARE COORDINATION | Facility: CLINIC | Age: 68
End: 2023-12-12

## 2023-12-12 ENCOUNTER — DOCUMENTATION (OUTPATIENT)
Dept: PRIMARY CARE | Facility: CLINIC | Age: 68
End: 2023-12-12

## 2023-12-12 ENCOUNTER — APPOINTMENT (OUTPATIENT)
Dept: PRIMARY CARE | Facility: CLINIC | Age: 68
End: 2023-12-12
Payer: MEDICARE

## 2023-12-12 NOTE — PROGRESS NOTES
Discharge Facility:[facility]  Discharge Diagnosis:  Presyncopal episode: vasovagal   Bradycardia  PAF     Admission Date: 12/10/2023  Discharge Date: 12/11/2023    PCP Appointment Date:NELIA  Specialist Appointment Date: Dr Foley/Cardiology 2/2/2024  Hospital Encounter and Summary:  not available at this time   See discharge assessment below for further details    Engagement  Call Start Time: 1550 (12/12/2023  3:55 PM)    Medications  Medications reviewed with patient/caregiver?: Yes (No additional meds. Change of meds: D/C Metoprolol) (12/12/2023  3:55 PM)  Is the patient having any side effects they believe may be caused by any medication additions or changes?: No (12/12/2023  3:55 PM)  Does the patient have all medications ordered at discharge?: Not applicable (12/12/2023  3:55 PM)  Care Management Interventions: No intervention needed (12/12/2023  3:55 PM)  Is the patient taking all medications as directed (includes completed medication regime)?: Yes (12/12/2023  3:55 PM)    Appointments  Does the patient have a primary care provider?: Yes (12/12/2023  3:55 PM)  Care Management Interventions: -- (Message to staff to schedule, no appointments open) (12/12/2023  3:55 PM)  Has the patient kept scheduled appointments due by today?: Yes (12/12/2023  3:55 PM)    Patient Teaching  Does the patient have access to their discharge instructions?: Yes (12/12/2023  3:55 PM)  What is the patient's perception of their health status since discharge?: Improving (12/12/2023  3:55 PM)  Is the patient/caregiver able to teach back the hierarchy of who to call/visit for symptoms/problems? PCP, Specialist, Home Health nurse, Urgent Care, ED, 911: Yes (12/12/2023  3:55 PM)    Wrap Up  Wrap Up Additional Comments: -- (Spoke to Berta. She states that Jabari is doing well. She is monitoring pulse and BP at home which she says is normal. She was unable to move up the cardiology appt 2/2/24.) (12/12/2023  3:55 PM)

## 2023-12-12 NOTE — TELEPHONE ENCOUNTER
Pt was discharged from [facility] and needs an appt. With Dr. Rivera. Please call and advise

## 2023-12-13 ENCOUNTER — TELEPHONE (OUTPATIENT)
Dept: PRIMARY CARE | Facility: CLINIC | Age: 68
End: 2023-12-13
Payer: MEDICARE

## 2023-12-19 DIAGNOSIS — G20.A1 PARKINSON'S DISEASE (MULTI): ICD-10-CM

## 2023-12-19 RX ORDER — CARBIDOPA AND LEVODOPA 25; 100 MG/1; MG/1
2 TABLET ORAL 3 TIMES DAILY
Qty: 540 TABLET | Refills: 3 | Status: SHIPPED | OUTPATIENT
Start: 2023-12-19

## 2023-12-22 ENCOUNTER — APPOINTMENT (OUTPATIENT)
Dept: PRIMARY CARE | Facility: CLINIC | Age: 68
End: 2023-12-22
Payer: MEDICARE

## 2023-12-22 ENCOUNTER — TELEPHONE (OUTPATIENT)
Dept: PRIMARY CARE | Facility: CLINIC | Age: 68
End: 2023-12-22

## 2023-12-22 NOTE — TELEPHONE ENCOUNTER
Patient's wife is ill unable to bring him for appt today asking to reschedule. Patient stopped Metoprolol /68 140/63 126/67 (   pulse 68,71,62) Offered virtual today wife declined please advise when where to schedule

## 2023-12-26 ENCOUNTER — APPOINTMENT (OUTPATIENT)
Dept: PRIMARY CARE | Facility: CLINIC | Age: 68
End: 2023-12-26
Payer: MEDICARE

## 2024-01-05 ENCOUNTER — LAB (OUTPATIENT)
Dept: LAB | Facility: LAB | Age: 69
End: 2024-01-05
Payer: MEDICARE

## 2024-01-05 ENCOUNTER — OFFICE VISIT (OUTPATIENT)
Dept: PRIMARY CARE | Facility: CLINIC | Age: 69
End: 2024-01-05
Payer: MEDICARE

## 2024-01-05 VITALS
OXYGEN SATURATION: 94 % | HEART RATE: 64 BPM | RESPIRATION RATE: 16 BRPM | SYSTOLIC BLOOD PRESSURE: 158 MMHG | DIASTOLIC BLOOD PRESSURE: 76 MMHG | TEMPERATURE: 98.4 F

## 2024-01-05 DIAGNOSIS — R73.03 PREDIABETES: ICD-10-CM

## 2024-01-05 DIAGNOSIS — G81.91 HEMIPARESIS, RIGHT (MULTI): ICD-10-CM

## 2024-01-05 DIAGNOSIS — I48.0 PAROXYSMAL ATRIAL FIBRILLATION (MULTI): ICD-10-CM

## 2024-01-05 DIAGNOSIS — E03.9 ACQUIRED HYPOTHYROIDISM: ICD-10-CM

## 2024-01-05 DIAGNOSIS — E55.9 VITAMIN D DEFICIENCY, UNSPECIFIED: ICD-10-CM

## 2024-01-05 DIAGNOSIS — I73.9 PVD (PERIPHERAL VASCULAR DISEASE) (CMS-HCC): ICD-10-CM

## 2024-01-05 DIAGNOSIS — Z89.432 STATUS POST AMPUTATION OF LEFT FOOT THROUGH METATARSAL BONE (MULTI): ICD-10-CM

## 2024-01-05 DIAGNOSIS — I10 ESSENTIAL HYPERTENSION WITH GOAL BLOOD PRESSURE LESS THAN 140/90: Primary | ICD-10-CM

## 2024-01-05 DIAGNOSIS — I10 ESSENTIAL (PRIMARY) HYPERTENSION: Primary | ICD-10-CM

## 2024-01-05 PROBLEM — E66.811 OBESITY, CLASS I, BMI 30-34.9: Status: ACTIVE | Noted: 2023-12-11

## 2024-01-05 PROBLEM — R00.1 BRADYCARDIA: Status: ACTIVE | Noted: 2023-12-11

## 2024-01-05 PROBLEM — E66.9 OBESITY, CLASS I, BMI 30-34.9: Status: ACTIVE | Noted: 2023-12-11

## 2024-01-05 PROBLEM — I34.1 MITRAL PROLAPSE: Status: ACTIVE | Noted: 2023-12-11

## 2024-01-05 LAB
25(OH)D3 SERPL-MCNC: 37 NG/ML (ref 30–100)
ALBUMIN SERPL BCP-MCNC: 4.1 G/DL (ref 3.4–5)
ANION GAP SERPL CALC-SCNC: 10 MMOL/L (ref 10–20)
APPEARANCE UR: CLEAR
BASOPHILS # BLD AUTO: 0.03 X10*3/UL (ref 0–0.1)
BASOPHILS NFR BLD AUTO: 0.5 %
BILIRUB UR STRIP.AUTO-MCNC: NEGATIVE MG/DL
BUN SERPL-MCNC: 16 MG/DL (ref 6–23)
CALCIUM SERPL-MCNC: 9 MG/DL (ref 8.6–10.3)
CHLORIDE SERPL-SCNC: 105 MMOL/L (ref 98–107)
CO2 SERPL-SCNC: 29 MMOL/L (ref 21–32)
COLOR UR: YELLOW
CREAT SERPL-MCNC: 0.76 MG/DL (ref 0.5–1.3)
EOSINOPHIL # BLD AUTO: 0.1 X10*3/UL (ref 0–0.7)
EOSINOPHIL NFR BLD AUTO: 1.8 %
ERYTHROCYTE [DISTWIDTH] IN BLOOD BY AUTOMATED COUNT: 14.3 % (ref 11.5–14.5)
EST. AVERAGE GLUCOSE BLD GHB EST-MCNC: 123 MG/DL
GFR SERPL CREATININE-BSD FRML MDRD: >90 ML/MIN/1.73M*2
GLUCOSE SERPL-MCNC: 104 MG/DL (ref 74–99)
GLUCOSE UR STRIP.AUTO-MCNC: NEGATIVE MG/DL
HBA1C MFR BLD: 5.9 %
HCT VFR BLD AUTO: 41.4 % (ref 41–52)
HGB BLD-MCNC: 13.2 G/DL (ref 13.5–17.5)
HYALINE CASTS #/AREA URNS AUTO: ABNORMAL /LPF
IMM GRANULOCYTES # BLD AUTO: 0.01 X10*3/UL (ref 0–0.7)
IMM GRANULOCYTES NFR BLD AUTO: 0.2 % (ref 0–0.9)
KETONES UR STRIP.AUTO-MCNC: ABNORMAL MG/DL
LEUKOCYTE ESTERASE UR QL STRIP.AUTO: NEGATIVE
LYMPHOCYTES # BLD AUTO: 1.3 X10*3/UL (ref 1.2–4.8)
LYMPHOCYTES NFR BLD AUTO: 23.5 %
MAGNESIUM SERPL-MCNC: 1.89 MG/DL (ref 1.6–2.4)
MCH RBC QN AUTO: 28.4 PG (ref 26–34)
MCHC RBC AUTO-ENTMCNC: 31.9 G/DL (ref 32–36)
MCV RBC AUTO: 89 FL (ref 80–100)
MONOCYTES # BLD AUTO: 0.4 X10*3/UL (ref 0.1–1)
MONOCYTES NFR BLD AUTO: 7.2 %
MUCOUS THREADS #/AREA URNS AUTO: ABNORMAL /LPF
NEUTROPHILS # BLD AUTO: 3.69 X10*3/UL (ref 1.2–7.7)
NEUTROPHILS NFR BLD AUTO: 66.8 %
NITRITE UR QL STRIP.AUTO: NEGATIVE
NRBC BLD-RTO: 0 /100 WBCS (ref 0–0)
PH UR STRIP.AUTO: 6 [PH]
PHOSPHATE SERPL-MCNC: 2.9 MG/DL (ref 2.5–4.9)
PLATELET # BLD AUTO: 195 X10*3/UL (ref 150–450)
POTASSIUM SERPL-SCNC: 3.5 MMOL/L (ref 3.5–5.3)
PROT UR STRIP.AUTO-MCNC: NEGATIVE MG/DL
RBC # BLD AUTO: 4.64 X10*6/UL (ref 4.5–5.9)
RBC # UR STRIP.AUTO: ABNORMAL /UL
RBC #/AREA URNS AUTO: ABNORMAL /HPF
SODIUM SERPL-SCNC: 140 MMOL/L (ref 136–145)
SP GR UR STRIP.AUTO: 1.01
TSH SERPL-ACNC: 2.22 MIU/L (ref 0.44–3.98)
URATE SERPL-MCNC: 6.4 MG/DL (ref 4–7.5)
UROBILINOGEN UR STRIP.AUTO-MCNC: <2 MG/DL
WBC # BLD AUTO: 5.5 X10*3/UL (ref 4.4–11.3)
WBC #/AREA URNS AUTO: ABNORMAL /HPF

## 2024-01-05 PROCEDURE — 1159F MED LIST DOCD IN RCRD: CPT | Performed by: INTERNAL MEDICINE

## 2024-01-05 PROCEDURE — 84443 ASSAY THYROID STIM HORMONE: CPT

## 2024-01-05 PROCEDURE — 36415 COLL VENOUS BLD VENIPUNCTURE: CPT

## 2024-01-05 PROCEDURE — 3078F DIAST BP <80 MM HG: CPT | Performed by: INTERNAL MEDICINE

## 2024-01-05 PROCEDURE — 80069 RENAL FUNCTION PANEL: CPT

## 2024-01-05 PROCEDURE — 83735 ASSAY OF MAGNESIUM: CPT

## 2024-01-05 PROCEDURE — 99214 OFFICE O/P EST MOD 30 MIN: CPT | Performed by: INTERNAL MEDICINE

## 2024-01-05 PROCEDURE — 83036 HEMOGLOBIN GLYCOSYLATED A1C: CPT

## 2024-01-05 PROCEDURE — 82306 VITAMIN D 25 HYDROXY: CPT

## 2024-01-05 PROCEDURE — 1036F TOBACCO NON-USER: CPT | Performed by: INTERNAL MEDICINE

## 2024-01-05 PROCEDURE — 1160F RVW MEDS BY RX/DR IN RCRD: CPT | Performed by: INTERNAL MEDICINE

## 2024-01-05 PROCEDURE — 85025 COMPLETE CBC W/AUTO DIFF WBC: CPT

## 2024-01-05 PROCEDURE — 3077F SYST BP >= 140 MM HG: CPT | Performed by: INTERNAL MEDICINE

## 2024-01-05 PROCEDURE — 81001 URINALYSIS AUTO W/SCOPE: CPT

## 2024-01-05 PROCEDURE — 84550 ASSAY OF BLOOD/URIC ACID: CPT

## 2024-01-05 RX ORDER — CEPHALEXIN 500 MG/1
500 CAPSULE ORAL EVERY 6 HOURS
COMMUNITY
Start: 2023-09-30 | End: 2024-01-05 | Stop reason: ALTCHOICE

## 2024-01-05 RX ORDER — CETIRIZINE HYDROCHLORIDE 10 MG/1
10 TABLET ORAL DAILY PRN
Status: SHIPPED | COMMUNITY
Start: 2024-01-05

## 2024-01-05 RX ORDER — FLUTICASONE PROPIONATE 50 MCG
2 SPRAY, SUSPENSION (ML) NASAL NIGHTLY PRN
Qty: 16 G | Status: SHIPPED | COMMUNITY
Start: 2024-01-05

## 2024-01-05 RX ORDER — DOXYCYCLINE 100 MG/1
CAPSULE ORAL
COMMUNITY
Start: 2023-12-09 | End: 2024-01-05 | Stop reason: ALTCHOICE

## 2024-01-05 RX ORDER — FLUOCINOLONE ACETONIDE 0.11 MG/ML
OIL TOPICAL
COMMUNITY
Start: 2023-12-19

## 2024-01-05 RX ORDER — AMLODIPINE BESYLATE 10 MG/1
10 TABLET ORAL DAILY
Qty: 90 TABLET | Refills: 3 | Status: SHIPPED | OUTPATIENT
Start: 2024-01-05 | End: 2024-01-07

## 2024-01-05 ASSESSMENT — ENCOUNTER SYMPTOMS
OCCASIONAL FEELINGS OF UNSTEADINESS: 1
DEPRESSION: 0
LOSS OF SENSATION IN FEET: 1

## 2024-01-05 ASSESSMENT — PATIENT HEALTH QUESTIONNAIRE - PHQ9
1. LITTLE INTEREST OR PLEASURE IN DOING THINGS: NOT AT ALL
2. FEELING DOWN, DEPRESSED OR HOPELESS: NOT AT ALL
SUM OF ALL RESPONSES TO PHQ9 QUESTIONS 1 AND 2: 0

## 2024-01-05 NOTE — PROGRESS NOTES
Subjective   Patient ID: Jabari Swan is a 68 y.o. male who presents for Hospital Follow-up (TCM).    Here for follow-up with his wife after admission to the hospital earlier in December.  He states that after going to the bathroom with a bowel movement he was somewhat pale and diaphoretic.  The squad was called and he went to the ER at [facility].  His blood pressure was low as was his pulse.  At that time the decision was made to discontinue his metoprolol.  His wife has been watching his vital signs since then    His blood pressure has been for the most part between 1 25-1 40 systolic, with heart rates typically in the 60 range.  He feels well without any dizziness since         Review of Systems    Objective   /76 (BP Location: Left arm, Patient Position: Sitting, BP Cuff Size: Adult)   Pulse 64   Temp 36.9 °C (98.4 °F)   Resp 16   SpO2 94%     Physical Exam  Constitutional:       Comments: Well-developed white male, alert and appropriate at baseline quite pleasant in a wheelchair, overweight  Eye exam revealed pupils equally round and reactive, with extraocular muscles intact. Normal sclera and eyelids.  Neck exam revealed no masses, adenopathy or thyromegaly. No neck pain on range of motion was noted.  Pulmonary exam revealed clear breath sounds bilaterally in all lung fields. No wheezes bronchitis or rales noted.  Cardiac exam revealed regular rate and rhythm without murmurs gallops or rubs.  No back flank or abdominal discomfort  Extremities bilateral compression hose-exam deferred  Neurologic exam at baseline-without any obvious cranial nerve deficits.  Upper extremity motor and sensory exam intact bilaterally  Gait not tested as he was in a wheelchair  Mental status was at baseline-without anxiousness or evidence of depression           Assessment/Plan   Problem List Items Addressed This Visit    None        Care planning-his wife is extraordinarily helpful-she was here today during the  visit    S/p overnight hospitalization 12/10/2023 with dizziness and bradycardia after a bowel movement-rather than vasovagal this was attributed to metoprolol effects.  Metoprolol has been discontinued and he has no subsequent symptoms.          Early January 2024-Home blood pressure log with systolic values typically between 1 25-1 35 systolic, with pulse in the low 60 range.  He is scheduled to see Dr. Foley in electrocardiology 2/2/2024.  His wife will continue to follow blood pressure and pulse and take that data in with their visit in 3 weeks.    S/p ER visit 7/7/2023 for right leg cellulitis concern-the ER placed him on Keflex.  He saw vascular surgery-stasis congestion suspected-venous ultrasound has been ordered-plan for tomorrow    S/p  hospitalization-with acute stay March 5 through 8, followed by rehab March 8 to 19.  At home again.  He will continue ongoing home exercises and therapy as needed fortunately improved     P. A-Fib/Arrhythmia - cardiologist, Dr. Foley has started metoprolol 25 mg one time a day. No dizziness.  Stable clinically from a rhythm standpoint. He will continue his medications and cardiology follow-up            Stable, saw Dr. Foley Nov '22. Next appt Nov '23     Pulmonary hypertension - echo April 2022 did show mild to moderate pulmonary hypertension but his cardiologist, did not feel that this was an issue. Otherwise normal EF without worrisome valvular findings on that echo     Bradycardia - discussed with wife in detail-although his heart rate gets into the high 40 range sometime he is not symptomatic. They reviewed this with Dr. Carnes who suggested reducing metoprolol succinate 25-1/2 tablet daily. Pulse rate has increased from the high 40s up into the 50 range. Recently between 55-64          1/24-off metoprolol pulse in the low 60 range     hypertension/chronic kidney disease-he will continue medications and nephrology follow-up with Dr. Sahu's successor,   Diane. He'll see her semiannually.             They will follow-up with Dr. Gutierrez semiannually             January 2024-blood pressure and pulse remain improved/stable off low-dose metoprolol.  Labs have been done for the visit with the nephrologist next week     Stasis dermatitis longstanding lymphedema/stasis cellulitis concerns -               Improved presently. He is on a stronger compression-he is able to put his socks on himself each morning. Uses triamcinolone cream and CeraVe -as recommended by the dermatologist, Dr. Villalobos who they will see as needed           Vascular surgery reevaluation 7/23 after ER visit.  Ultrasound pending        Edema - stable, compression hose daily, also uses a pump in the evening, continue Lasix as directed - this was recently decreased from twice daily by nephrology. Patient encouraged to get a scale so that he can monitor weight at home and keep log.    His wife states that his nephrologist suggested reducing the furosemide a bit-they will take 1 tablet Monday Wednesday Friday, 2 tablets on the other days and start doing weights at home     Parkinson's - not problematic, without noticeable tremor with medications per Dr. Peters in neurology- just saw last month; cont. semiannual visits. Overall doing well from this standpoint. To see Dr. Peters semiannually. Tremor not obvious today and exam. Doing well presently. semiannual visits planned. Tolerating medications. No recent concerns stable course.   He will see Dr. Lyle annually -each October. Clinically stable    Multiple medications-patient is on multiple medications. Medication list reviewed with the patient and reconciled and updated in the EMR accordingly. Ongoing efforts to minimize the number of medications medications as well as optimize dosing and compliance continues to be an ongoing priority.          1/24-medication list reviewed       Remote intracranial bleed/right-sided hemiparesis/gait unsteadiness - stable  course. He's functionally independent for the most part at home. His wife continues to be extremely supportive accordingly. He remained stable fortunately   He will continue caution ambulating and continue exercises and stretches to maintain strength stamina as well as ambulation conditioning. He uses a walker at home typically fortunately no recent falls. Physical therapy continues on an as-needed basis     Hypothyroidism - we'll continue to follow thyroid labs semi-annually   Sept '22 TSH normal, continue present regimen.           1/24 TSH pending     Prediabetes - monitoring on semiannual basis   Jan '22, A1c 6.1%   Sept '22, A1c 6.1%, stable from prior, discussed weight loss efforts today.        1/24-A1c pending     Dyslipidemia/elevated weight -goal LDL under 100   Sept 2022 lipids slightly elevated but overall stable. Health lifestyle measures encouraged. He will continue to follow-up with cardiology (Dr. Foley) accordingly.    Postnasal drip-he will continue the Flonase and restart Zyrtec     History of Recurrent pneumonia/with hospitalization again with another rehabilitation stay-    Improved presently no shortness of breath. Occas cough. Using incentive spirometer a 3 times daily        History of recurrent Left foot cellulitis /multiple amputations- he will try to keep this open to the air during the day to help keep this dry between the toes. He will see his podiatrist, Dr. Thurston as planned, no recent problems with consistent care     Urinary incontinence/BPH/nephrolithiasis/hypogonadism will continue medications per urology - He follows with Dr. Srivastava in urology--    Vitamin D deficiency - encouraged patient to continue vitamin D daily as ordered. Will consider vitamin D level regularly.   Sept '22 vitamin D stable/normal.     Colon care/colon polyps - as above colonoscopy planned routinely at some point with Dr. Camacho off the Eliquis   He had multiple tubular adenomatous polyps on his 12/21  "colonoscopy. Next in 3 years-December 2024.     Foot care/status post right toe amputations - there is no evidence of any left toe infections today. He will continue with Dr. Groves in podiatry every 6 weeks. Appt yesterday \"everything fine\"    He will see Dr. Groves every 3 months or so-Aquaphor is used-Next appointment January 2023        Skin care/scalp psoriasis/eczema - per Dr. Amaya annually or as needed. UTD.      Dental care - continue every 6 months     Elevated IOP / Vision care/bilateral cataract/glasses -  visits continue. He will see Dr. Riley August 2021 to address his bilateral cataracts.           At this point the cataracts are not ready to be removed. They will continue to see her regularly              S/p right laser procedure, left laser eye planned soon in Aug '23 for pressure concerns.                    Flu shot - each fall after Labor Day encouraged    Follow-up in 4 months, sooner as needed  "

## 2024-01-06 DIAGNOSIS — I10 ESSENTIAL HYPERTENSION WITH GOAL BLOOD PRESSURE LESS THAN 140/90: ICD-10-CM

## 2024-01-07 RX ORDER — AMLODIPINE BESYLATE 10 MG/1
10 TABLET ORAL DAILY
Qty: 90 TABLET | Refills: 3 | Status: SHIPPED | OUTPATIENT
Start: 2024-01-07 | End: 2024-01-15 | Stop reason: SDUPTHER

## 2024-01-08 NOTE — RESULT ENCOUNTER NOTE
Jabari    I am glad that the semiannual thyroid lab looks fine and that the A1c, the 3-month measure of the prediabetes concern has improved a bit from where it was in September.  Keep up the good work!    Sincerely,  Eveertt Rivera MD

## 2024-01-12 DIAGNOSIS — I10 ESSENTIAL HYPERTENSION WITH GOAL BLOOD PRESSURE LESS THAN 140/90: ICD-10-CM

## 2024-01-15 RX ORDER — AMLODIPINE BESYLATE 10 MG/1
10 TABLET ORAL DAILY
Qty: 90 TABLET | Refills: 3 | Status: SHIPPED | OUTPATIENT
Start: 2024-01-15

## 2024-01-19 ENCOUNTER — TELEPHONE (OUTPATIENT)
Dept: PRIMARY CARE | Facility: CLINIC | Age: 69
End: 2024-01-19
Payer: MEDICARE

## 2024-01-24 ENCOUNTER — TELEPHONE (OUTPATIENT)
Dept: PRIMARY CARE | Facility: CLINIC | Age: 69
End: 2024-01-24
Payer: MEDICARE

## 2024-01-24 NOTE — TELEPHONE ENCOUNTER
Pt wife, Berta called having to cx appt pt is back in hospital [place] with pneumonia, RVS and is in isolation.    Please call pt at  [phone]

## 2024-01-29 ENCOUNTER — APPOINTMENT (OUTPATIENT)
Dept: PRIMARY CARE | Facility: CLINIC | Age: 69
End: 2024-01-29
Payer: MEDICARE

## 2024-01-29 ENCOUNTER — DOCUMENTATION (OUTPATIENT)
Dept: PRIMARY CARE | Facility: CLINIC | Age: 69
End: 2024-01-29

## 2024-01-29 ENCOUNTER — TELEPHONE (OUTPATIENT)
Dept: PRIMARY CARE | Facility: CLINIC | Age: 69
End: 2024-01-29

## 2024-01-29 DIAGNOSIS — I48.0 PAROXYSMAL ATRIAL FIBRILLATION (MULTI): ICD-10-CM

## 2024-01-29 DIAGNOSIS — I73.9 PVD (PERIPHERAL VASCULAR DISEASE) (CMS-HCC): ICD-10-CM

## 2024-01-29 NOTE — TELEPHONE ENCOUNTER
[facility] - needs verbal order Dr Rivera to follow for home health care [phone] ext [phone] - ok to leave message

## 2024-01-29 NOTE — TELEPHONE ENCOUNTER
----- Message from Roseanne Kong RN sent at 1/29/2024  2:50 PM EST -----  Regarding: suction machine  Hello,    The wife states that the patient is having a hard time bringing up the mucous in the back of his throat.   Wife is asking for a portable suction machine for home.  Please advise.     Roseanne

## 2024-01-29 NOTE — PROGRESS NOTES
Discharge facility:[facility]  Discharge diagnosis:Acute hypoxic respiratory failure   Admission date:1/20/2024  Discharge date: 1/26/2024    PCP Appointment Date:1/31/2024  Specialist Appointment Date: February  Pulmonary  Hospital Encounter and Summary: Linked   See Discharge assessment below for further details      Medications  Medications reviewed with patient/caregiver?: Yes (1/29/2024  2:20 PM)  Is the patient having any side effects they believe may be caused by any medication additions or changes?: No (1/29/2024  2:20 PM)  Does the patient have all medications ordered at discharge?: Yes (1/29/2024  2:20 PM)  Care Management Interventions: Provided patient education (1/29/2024  2:20 PM)  Is the patient taking all medications as directed (includes completed medication regime)?: Yes (1/29/2024  2:20 PM)  Care Management Interventions: Provided patient education (1/29/2024  2:20 PM)  Medication Comments: patient completed doxycycline (1/29/2024  2:20 PM)    Appointments  Does the patient have a primary care provider?: Yes (1/29/2024  2:20 PM)  Care Management Interventions: Verified appointment date/time/provider (Dr Rivera 1/31/2024) (1/29/2024  2:20 PM)  Has the patient kept scheduled appointments due by today?: Yes (1/29/2024  2:20 PM)  Care Management Interventions: Advised patient to keep appointment (1/29/2024  2:20 PM)    Self Management  What is the home health agency?: [facility] (1/29/2024  2:20 PM)  Has home health visited the patient within 72 hours of discharge?: Yes (1/29/2024  2:20 PM)  What Durable Medical Equipment (DME) was ordered?: none (1/29/2024  2:20 PM)    Patient Teaching  Does the patient have access to their discharge instructions?: Yes (1/29/2024  2:20 PM)  Care Management Interventions: Reviewed instructions with patient (1/29/2024  2:20 PM)    Wrap Up  Is the patient/caregiver familiar with Advance Care Planning?: Yes (1/29/2024  2:20 PM)  Would the patient like more  information on Advance Care Planning?: No (1/29/2024  2:20 PM)     TCM completed on :1/29/2024  Discharge date:1/26/2024  Pt has follow up on :1/31/2024    If patient meets criteria for moderately complex & has follow-up within 14 days-can bill [id number].   If patient meets criteria for highly complex & has follow-up visit within 7 days-can bill [id number].    *virtual follow up needs modifier added (95 or GT)   *AWV AND TCM CAN BE BILLED TOGETHER WITH 25 MODIFIER     I called and spoke with the patient and his wife.  Per the wife, the patient is still coughing a lot.  No fever or chills.  She called pulmonary and they changed his mucinex to robitussin, which is helping.  Patient has a lot of phlegm in the back of his throat that he is having a hard time bringing up.  Wife is asking for a suction machine like he had in the hospital.  Patient is using his nebulizer and taking his steroid as directed.  Home care nurse started 1/28/2024.  Patient does not need PT/OT.   Follow up with PCP scheduled for 1/31/2024.  Wife will call with any questions or concerns.

## 2024-01-31 ENCOUNTER — OFFICE VISIT (OUTPATIENT)
Dept: PRIMARY CARE | Facility: CLINIC | Age: 69
End: 2024-01-31
Payer: MEDICARE

## 2024-01-31 VITALS
DIASTOLIC BLOOD PRESSURE: 63 MMHG | OXYGEN SATURATION: 94 % | SYSTOLIC BLOOD PRESSURE: 143 MMHG | HEART RATE: 66 BPM | RESPIRATION RATE: 16 BRPM | TEMPERATURE: 98.2 F

## 2024-01-31 DIAGNOSIS — I89.0 LYMPHEDEMA: ICD-10-CM

## 2024-01-31 DIAGNOSIS — I48.0 PAROXYSMAL ATRIAL FIBRILLATION (MULTI): ICD-10-CM

## 2024-01-31 DIAGNOSIS — R53.83 OTHER FATIGUE: ICD-10-CM

## 2024-01-31 DIAGNOSIS — I10 ESSENTIAL HYPERTENSION WITH GOAL BLOOD PRESSURE LESS THAN 140/90: ICD-10-CM

## 2024-01-31 DIAGNOSIS — J12.1 RSV (RESPIRATORY SYNCYTIAL VIRUS PNEUMONIA): Primary | ICD-10-CM

## 2024-01-31 DIAGNOSIS — I27.20 PULMONARY HYPERTENSION (MULTI): ICD-10-CM

## 2024-01-31 PROCEDURE — 99496 TRANSJ CARE MGMT HIGH F2F 7D: CPT | Performed by: INTERNAL MEDICINE

## 2024-01-31 PROCEDURE — 1036F TOBACCO NON-USER: CPT | Performed by: INTERNAL MEDICINE

## 2024-01-31 PROCEDURE — 1123F ACP DISCUSS/DSCN MKR DOCD: CPT | Performed by: INTERNAL MEDICINE

## 2024-01-31 PROCEDURE — 3077F SYST BP >= 140 MM HG: CPT | Performed by: INTERNAL MEDICINE

## 2024-01-31 PROCEDURE — 3078F DIAST BP <80 MM HG: CPT | Performed by: INTERNAL MEDICINE

## 2024-01-31 PROCEDURE — 1159F MED LIST DOCD IN RCRD: CPT | Performed by: INTERNAL MEDICINE

## 2024-01-31 PROCEDURE — 1160F RVW MEDS BY RX/DR IN RCRD: CPT | Performed by: INTERNAL MEDICINE

## 2024-01-31 RX ORDER — IPRATROPIUM BROMIDE AND ALBUTEROL SULFATE 2.5; .5 MG/3ML; MG/3ML
SOLUTION RESPIRATORY (INHALATION)
COMMUNITY

## 2024-01-31 RX ORDER — LIDOCAINE 560 MG/1
1 PATCH PERCUTANEOUS; TOPICAL; TRANSDERMAL
COMMUNITY
Start: 2024-01-10 | End: 2024-03-11 | Stop reason: SINTOL

## 2024-01-31 RX ORDER — POLYETHYLENE GLYCOL 3350 17 G/17G
17 POWDER, FOR SOLUTION ORAL
COMMUNITY
Start: 2024-01-10 | End: 2024-05-22 | Stop reason: WASHOUT

## 2024-01-31 RX ORDER — METHOCARBAMOL 1000 MG/1
1000 TABLET, COATED ORAL 3 TIMES DAILY
COMMUNITY
Start: 2024-01-10 | End: 2024-05-22 | Stop reason: WASHOUT

## 2024-01-31 RX ORDER — PREDNISONE 10 MG/1
TABLET ORAL
COMMUNITY
Start: 2021-09-15 | End: 2024-02-04

## 2024-01-31 RX ORDER — METHYLPREDNISOLONE 4 MG/1
TABLET ORAL
COMMUNITY
Start: 2024-01-10 | End: 2024-03-11 | Stop reason: ALTCHOICE

## 2024-01-31 RX ORDER — LOSARTAN POTASSIUM 50 MG/1
50 TABLET ORAL DAILY
Status: SHIPPED | COMMUNITY
Start: 2024-01-31

## 2024-01-31 RX ORDER — DOXYCYCLINE 100 MG/1
CAPSULE ORAL
COMMUNITY
Start: 2024-01-26 | End: 2024-03-11 | Stop reason: ALTCHOICE

## 2024-01-31 ASSESSMENT — ENCOUNTER SYMPTOMS
LOSS OF SENSATION IN FEET: 1
OCCASIONAL FEELINGS OF UNSTEADINESS: 0
DEPRESSION: 0

## 2024-01-31 NOTE — PROGRESS NOTES
"Patient: Jabari Swan  : 1955  PCP: Everett Rivera MD  MRN: [id number]  Program: No linked episodes     Jabair Swan is a 69 y.o. male presenting today for follow-up after being discharged from the hospital 5 days ago. The main problem requiring admission was pneumonia The discharge summary and/or Transitional Care Management documentation was reviewed. Medication reconciliation was performed as indicated via the \"Edgardo as Reviewed\" timestamp.     Jabari Swan was contacted by Transitional Care Management services two days after his discharge. This encounter and supporting documentation was reviewed.    The complexity of medical decision making for this patient's transitional care is high.    Physical Exam  Constitutional:       Comments: Tired appearing male in no distress-with occasional cough but no shortness of breath or evidence of respiratory distress.  Somewhat congested  Eye exam revealed pupils equally round and reactive, with extraocular muscles intact. Normal sclera and eyelids.  Neck exam revealed no masses, adenopathy or thyromegaly. No neck pain on range of motion was noted.  Pulmonary exam revealed clear breath sounds bilaterally in all lung fields. No wheezes bronchitis or rales noted.  Cardiac exam revealed regular rate and rhythm without murmurs gallops or rubs.  No back flank or abdominal discomfort  Extremities at baseline-compression hose in place feet exam deferred  Mental status at baseline-appropriate engaged in the conversation answering questions appropriately  Nonfocal neurologic exam without cranial or peripheral motor or sensory deficits.         Assessment/Plan   Problem List Items Addressed This Visit             ICD-10-CM    Essential hypertension with goal blood pressure less than 140/90 I10    Relevant Medications    losartan (Cozaar) 50 mg tablet    Fatigue R53.83    Lymphedema I89.0    Paroxysmal atrial fibrillation (CMS/HCC) I48.0    Pulmonary hypertension (CMS/HCC) I27.20 "    RSV (respiratory syncytial virus pneumonia) - Primary J12.1       Review of Systems    Family History   Problem Relation Name Age of Onset    Other (Brain tumor) Mother      Stroke Father      Stroke Sister      Lung cancer Sister         Medications  Medications reviewed with patient/caregiver?: Yes (1/29/2024  2:20 PM)  Is the patient having any side effects they believe may be caused by any medication additions or changes?: No (1/29/2024  2:20 PM)  Does the patient have all medications ordered at discharge?: Yes (1/29/2024  2:20 PM)  Care Management Interventions: Provided patient education (1/29/2024  2:20 PM)  Is the patient taking all medications as directed (includes completed medication regime)?: Yes (1/29/2024  2:20 PM)  Care Management Interventions: Provided patient education (1/29/2024  2:20 PM)  Medication Comments: patient completed doxycycline (1/29/2024  2:20 PM)    Appointments  Does the patient have a primary care provider?: Yes (1/29/2024  2:20 PM)  Care Management Interventions: Verified appointment date/time/provider (Dr Rivera 1/31/2024) (1/29/2024  2:20 PM)  Has the patient kept scheduled appointments due by today?: Yes (1/29/2024  2:20 PM)  Care Management Interventions: Advised patient to keep appointment (1/29/2024  2:20 PM)    Self Management  What is the home health agency?: [facility] (1/29/2024  2:20 PM)  Has home health visited the patient within 72 hours of discharge?: Yes (1/29/2024  2:20 PM)  What Durable Medical Equipment (DME) was ordered?: none (1/29/2024  2:20 PM)    Patient Teaching  Does the patient have access to their discharge instructions?: Yes (1/29/2024  2:20 PM)  Care Management Interventions: Reviewed instructions with patient (1/29/2024  2:20 PM)    Wrap Up  Is the patient/caregiver familiar with Advance Care Planning?: Yes (1/29/2024  2:20 PM)  Would the patient like more information on Advance Care Planning?: No (1/29/2024  2:20 PM)      Care  planning-his wife is extraordinarily helpful-she was here today during the visit.  She will continue her remarkable efforts and contact me by phone or [employer] messaging with any additional concerns.  We spoke at length today regarding home issues including his medication, and other home concerns accordingly.    S/p hospitalization 1/20/2024-1/26/2024 with acute hypoxic respiratory failure associated with RSV infection-now at home this week-clinically improved though he still has a significant cough with postnasal drip.  He did have a fiberoptic bronchoscopy the day of discharge. oxygen saturations at home typically in the 94 to 95% though a bit lower this morning.  His wife has been very attentive to his pulmonary and congestion medications.  Additionally he has been on doxycycline.  His albuterol nebulizers initially were 4 times daily now 3 times daily steroid taper and will continue.  She will continue the Mucinex maximum strength DM with ample fluids twice daily    Pulmonology care-he is scheduled to follow-up with Dr. So February 2/18/2024 in pulmonology this month-PFTs and a sleep study have been planned, following his bronchoscopy 1/26/2024 while admitted.    Postnasal drip-he will continue the Flonase twice daily and restart Zyrtec twice daily.    Home health care-he will continue home nursing care-who came on Sunday.    S/p overnight hospitalization 12/10/2023 with dizziness and bradycardia after a bowel movement-rather than vasovagal this was attributed to metoprolol effects.  Metoprolol has been discontinued and he has no subsequent symptoms.          Early January 2024-Home blood pressure log with systolic values typically between 1 25-1 35 systolic, with pulse in the low 60 range.  He is scheduled to see Dr. Foley in electrocardiology 2/2/2024.  His wife will continue to follow blood pressure and pulse and take that data in with their visit in 3 weeks.    S/p ER visit 7/7/2023 for right leg  cellulitis concern-the ER placed him on Keflex.  He saw vascular surgery-stasis congestion suspected-venous ultrasound has been ordered-plan for tomorrow    S/p  hospitalization-with acute stay March 5 through 8, followed by rehab March 8 to 19.  At home again.  He will continue ongoing home exercises and therapy as needed fortunately improved     P. A-Fib/Arrhythmia - cardiologist, Dr. Foley has started metoprolol 25 mg one time a day. No dizziness.  Stable clinically from a rhythm standpoint. He will continue his medications and cardiology follow-up            Stable, saw Dr. Foley Nov '22. Next appt Nov '23     Pulmonary hypertension - echo April 2022 did show mild to moderate pulmonary hypertension but his cardiologist, did not feel that this was an issue. Otherwise normal EF without worrisome valvular findings on that echo     Bradycardia - discussed with wife in detail-although his heart rate gets into the high 40 range sometime he is not symptomatic. They reviewed this with Dr. Carnes who suggested reducing metoprolol succinate 25-1/2 tablet daily. Pulse rate has increased from the high 40s up into the 50 range. Recently between 55-64          1/24-off metoprolol pulse in the low 60 range     hypertension/chronic kidney disease-he will continue medications and nephrology follow-up with Dr. Sahu's successor, Dr. Contreras. He'll see her semiannually.             They will follow-up with Dr. Gutierrez semiannually             January 2024-blood pressure and pulse remain improved/stable off low-dose metoprolol.  Labs have been done for the visit with the nephrologist next week     Stasis dermatitis longstanding lymphedema/stasis cellulitis concerns -               Improved presently. He is on a stronger compression-he is able to put his socks on himself each morning. Uses triamcinolone cream and CeraVe -as recommended by the dermatologist, Dr. Villalobos who they will see as needed           Vascular surgery  reevaluation 7/23 after ER visit.  Ultrasound pending        Edema - stable, compression hose daily, also uses a pump in the evening, continue Lasix as directed - this was recently decreased from twice daily by nephrology. Patient encouraged to get a scale so that he can monitor weight at home and keep log.    His wife states that his nephrologist suggested reducing the furosemide a bit-they will take 1 tablet Monday Wednesday Friday, 2 tablets on the other days and start doing weights at home     Parkinson's - not problematic, without noticeable tremor with medications per Dr. Peters in neurology- just saw last month; cont. semiannual visits. Overall doing well from this standpoint. To see Dr. Peters semiannually. Tremor not obvious today and exam. Doing well presently. semiannual visits planned. Tolerating medications. No recent concerns stable course.   He will see Dr. Lyle annually -each October. Clinically stable    Multiple medications-patient is on multiple medications. Medication list reviewed with the patient and reconciled and updated in the EMR accordingly. Ongoing efforts to minimize the number of medications medications as well as optimize dosing and compliance continues to be an ongoing priority.          1/24-medication list reviewed       Remote intracranial bleed/right-sided hemiparesis/gait unsteadiness - stable course. He's functionally independent for the most part at home. His wife continues to be extremely supportive accordingly. He remained stable fortunately   He will continue caution ambulating and continue exercises and stretches to maintain strength stamina as well as ambulation conditioning. He uses a walker at home typically fortunately no recent falls. Physical therapy continues on an as-needed basis     Hypothyroidism - we'll continue to follow thyroid labs semi-annually   Sept '22 TSH normal, continue present regimen.           1/24 TSH pending     Prediabetes - monitoring on  "semiannual basis   Jan '22, A1c 6.1%   Sept '22, A1c 6.1%, stable from prior, discussed weight loss efforts today.        1/24-A1c pending     Dyslipidemia/elevated weight -goal LDL under 100   Sept 2022 lipids slightly elevated but overall stable. Health lifestyle measures encouraged. He will continue to follow-up with cardiology (Dr. Foley) accordingly.    Postnasal drip-he will continue the Flonase and restart Zyrtec     History of Recurrent pneumonia/with hospitalization again with another rehabilitation stay-    Improved presently no shortness of breath. Occas cough. Using incentive spirometer a 3 times daily        History of recurrent Left foot cellulitis /multiple amputations- he will try to keep this open to the air during the day to help keep this dry between the toes. He will see his podiatrist, Dr. Thurston as planned, no recent problems with consistent care     Urinary incontinence/BPH/nephrolithiasis/hypogonadism will continue medications per urology - He follows with Dr. Srivastava in urology--    Vitamin D deficiency - encouraged patient to continue vitamin D daily as ordered. Will consider vitamin D level regularly.   Sept '22 vitamin D stable/normal.     Colon care/colon polyps - as above colonoscopy planned routinely at some point with Dr. Camacho off the Eliquis   He had multiple tubular adenomatous polyps on his 12/21 colonoscopy. Next in 3 years-December 2024.     Foot care/status post right toe amputations - there is no evidence of any left toe infections today. He will continue with Dr. Groves in podiatry every 6 weeks. Appt yesterday \"everything fine\"    He will see Dr. Groves every 3 months or so-Aquaphor is used-Next appointment January 2023        Skin care/scalp psoriasis/eczema - per Dr. Amaya annually or as needed. UTD.      Dental care - continue every 6 months     Elevated IOP / Vision care/bilateral cataract/glasses -  visits continue. He will see Dr. Riley August 2021 to address his " bilateral cataracts.           At this point the cataracts are not ready to be removed. They will continue to see her regularly              S/p right laser procedure, left laser eye planned soon in Aug '23 for pressure concerns.                    Flu shot - each fall after Labor Day encouraged-10/23    COVID booster-11/23    Tdap booster-will be needed this spring    RSV vaccination-following RSV pneumonia late January 2024-will plan to get this later this spring    Shingrix series-completed 2018    Follow-up in 12 weeks, sooner as needed

## 2024-02-03 PROBLEM — I27.20 PULMONARY HYPERTENSION (MULTI): Status: ACTIVE | Noted: 2024-02-03

## 2024-02-03 PROBLEM — J12.1 RSV (RESPIRATORY SYNCYTIAL VIRUS PNEUMONIA): Status: ACTIVE | Noted: 2024-02-03

## 2024-02-16 ENCOUNTER — TELEPHONE (OUTPATIENT)
Dept: PRIMARY CARE | Facility: CLINIC | Age: 69
End: 2024-02-16
Payer: MEDICARE

## 2024-02-16 NOTE — TELEPHONE ENCOUNTER
Pt of Dr Rivera's. Wife called in to let us know that he went to [facility] on Saturday and is discharging today. He was diagnosed with stasis dermatitis and they are awaiting labs to determine if he had vasculitis of the skin. He is going to [facility] and is expected to be there for his recovery since he is having difficulty walking. Berta states you may call her if you need. Please advise. Thank you!

## 2024-02-29 ENCOUNTER — TELEPHONE (OUTPATIENT)
Dept: PRIMARY CARE | Facility: CLINIC | Age: 69
End: 2024-02-29
Payer: MEDICARE

## 2024-02-29 NOTE — TELEPHONE ENCOUNTER
[facility]/[phone]/checking on status of CMS - 485 plan of care/30 days overdue/Please advise of status of completion/last request was faxed 2/15/2024/Order number: [id number].

## 2024-03-11 ENCOUNTER — DOCUMENTATION (OUTPATIENT)
Dept: PRIMARY CARE | Facility: CLINIC | Age: 69
End: 2024-03-11

## 2024-03-11 ENCOUNTER — LAB (OUTPATIENT)
Dept: LAB | Facility: LAB | Age: 69
End: 2024-03-11
Payer: MEDICARE

## 2024-03-11 ENCOUNTER — TELEPHONE (OUTPATIENT)
Dept: PRIMARY CARE | Facility: CLINIC | Age: 69
End: 2024-03-11

## 2024-03-11 ENCOUNTER — OFFICE VISIT (OUTPATIENT)
Dept: PRIMARY CARE | Facility: CLINIC | Age: 69
End: 2024-03-11
Payer: MEDICARE

## 2024-03-11 DIAGNOSIS — I27.20 PULMONARY HYPERTENSION (MULTI): ICD-10-CM

## 2024-03-11 DIAGNOSIS — D64.9 MILD ANEMIA: ICD-10-CM

## 2024-03-11 DIAGNOSIS — I87.323 CHRONIC VENOUS HYPERTENSION WITH INFLAMMATION INVOLVING BOTH SIDES: ICD-10-CM

## 2024-03-11 DIAGNOSIS — N18.31 STAGE 3A CHRONIC KIDNEY DISEASE (MULTI): ICD-10-CM

## 2024-03-11 DIAGNOSIS — R76.8 P-ANCA TITER POSITIVE: ICD-10-CM

## 2024-03-11 DIAGNOSIS — I10 ESSENTIAL HYPERTENSION WITH GOAL BLOOD PRESSURE LESS THAN 140/90: ICD-10-CM

## 2024-03-11 DIAGNOSIS — E03.9 ACQUIRED HYPOTHYROIDISM: ICD-10-CM

## 2024-03-11 DIAGNOSIS — I48.0 PAROXYSMAL ATRIAL FIBRILLATION (MULTI): ICD-10-CM

## 2024-03-11 DIAGNOSIS — I73.9 PVD (PERIPHERAL VASCULAR DISEASE) (CMS-HCC): ICD-10-CM

## 2024-03-11 DIAGNOSIS — I87.2 VENOUS STASIS DERMATITIS OF BOTH LOWER EXTREMITIES: Primary | ICD-10-CM

## 2024-03-11 DIAGNOSIS — E61.1 IRON DEFICIENCY: ICD-10-CM

## 2024-03-11 PROBLEM — J12.1 RSV (RESPIRATORY SYNCYTIAL VIRUS PNEUMONIA): Status: RESOLVED | Noted: 2024-02-03 | Resolved: 2024-03-11

## 2024-03-11 LAB
ANION GAP SERPL CALC-SCNC: 10 MMOL/L (ref 10–20)
BUN SERPL-MCNC: 17 MG/DL (ref 6–23)
CALCIUM SERPL-MCNC: 9.5 MG/DL (ref 8.6–10.6)
CHLORIDE SERPL-SCNC: 105 MMOL/L (ref 98–107)
CO2 SERPL-SCNC: 31 MMOL/L (ref 21–32)
CREAT SERPL-MCNC: 0.84 MG/DL (ref 0.5–1.3)
EGFRCR SERPLBLD CKD-EPI 2021: >90 ML/MIN/1.73M*2
ERYTHROCYTE [DISTWIDTH] IN BLOOD BY AUTOMATED COUNT: 15.1 % (ref 11.5–14.5)
FOLATE SERPL-MCNC: >24 NG/ML
GLUCOSE SERPL-MCNC: 108 MG/DL (ref 74–99)
HCT VFR BLD AUTO: 41.4 % (ref 41–52)
HGB BLD-MCNC: 12.9 G/DL (ref 13.5–17.5)
IRON SATN MFR SERPL: 12 % (ref 25–45)
IRON SERPL-MCNC: 41 UG/DL (ref 35–150)
MCH RBC QN AUTO: 28.9 PG (ref 26–34)
MCHC RBC AUTO-ENTMCNC: 31.2 G/DL (ref 32–36)
MCV RBC AUTO: 93 FL (ref 80–100)
NRBC BLD-RTO: 0 /100 WBCS (ref 0–0)
PLATELET # BLD AUTO: 205 X10*3/UL (ref 150–450)
POTASSIUM SERPL-SCNC: 4.1 MMOL/L (ref 3.5–5.3)
RBC # BLD AUTO: 4.47 X10*6/UL (ref 4.5–5.9)
SODIUM SERPL-SCNC: 142 MMOL/L (ref 136–145)
TIBC SERPL-MCNC: 339 UG/DL (ref 240–445)
UIBC SERPL-MCNC: 298 UG/DL (ref 110–370)
VIT B12 SERPL-MCNC: 715 PG/ML (ref 211–911)
WBC # BLD AUTO: 6.8 X10*3/UL (ref 4.4–11.3)

## 2024-03-11 PROCEDURE — 3078F DIAST BP <80 MM HG: CPT | Performed by: INTERNAL MEDICINE

## 2024-03-11 PROCEDURE — 1160F RVW MEDS BY RX/DR IN RCRD: CPT | Performed by: INTERNAL MEDICINE

## 2024-03-11 PROCEDURE — 83516 IMMUNOASSAY NONANTIBODY: CPT

## 2024-03-11 PROCEDURE — 1123F ACP DISCUSS/DSCN MKR DOCD: CPT | Performed by: INTERNAL MEDICINE

## 2024-03-11 PROCEDURE — 80048 BASIC METABOLIC PNL TOTAL CA: CPT

## 2024-03-11 PROCEDURE — 99496 TRANSJ CARE MGMT HIGH F2F 7D: CPT | Performed by: INTERNAL MEDICINE

## 2024-03-11 PROCEDURE — 83550 IRON BINDING TEST: CPT

## 2024-03-11 PROCEDURE — 83540 ASSAY OF IRON: CPT

## 2024-03-11 PROCEDURE — 1036F TOBACCO NON-USER: CPT | Performed by: INTERNAL MEDICINE

## 2024-03-11 PROCEDURE — 82607 VITAMIN B-12: CPT

## 2024-03-11 PROCEDURE — 82746 ASSAY OF FOLIC ACID SERUM: CPT

## 2024-03-11 PROCEDURE — 86036 ANCA SCREEN EACH ANTIBODY: CPT

## 2024-03-11 PROCEDURE — 3075F SYST BP GE 130 - 139MM HG: CPT | Performed by: INTERNAL MEDICINE

## 2024-03-11 PROCEDURE — 1159F MED LIST DOCD IN RCRD: CPT | Performed by: INTERNAL MEDICINE

## 2024-03-11 PROCEDURE — 36415 COLL VENOUS BLD VENIPUNCTURE: CPT

## 2024-03-11 PROCEDURE — 85027 COMPLETE CBC AUTOMATED: CPT

## 2024-03-11 RX ORDER — MUPIROCIN 20 MG/G
OINTMENT TOPICAL
Qty: 44 G | Refills: 3 | Status: SHIPPED | OUTPATIENT
Start: 2024-03-11

## 2024-03-11 RX ORDER — TRIAMCINOLONE ACETONIDE 1 MG/G
CREAM TOPICAL 2 TIMES DAILY
Qty: 454 G | Refills: 2 | Status: SHIPPED | OUTPATIENT
Start: 2024-03-11

## 2024-03-11 ASSESSMENT — PATIENT HEALTH QUESTIONNAIRE - PHQ9
2. FEELING DOWN, DEPRESSED OR HOPELESS: NOT AT ALL
SUM OF ALL RESPONSES TO PHQ9 QUESTIONS 1 AND 2: 0
1. LITTLE INTEREST OR PLEASURE IN DOING THINGS: NOT AT ALL

## 2024-03-11 NOTE — PROGRESS NOTES
"Patient: Jabari Swan  : 1955  PCP: Everett Rivera MD  MRN: [id number]  Program: Transitional Care Management  Status: Enrolled  Effective Dates: 2024 - present  Responsible Staff: Roseanne Kong RN  Social Determinants to be Addressed: Alcohol Use, Financial Resource Strain, Physical Activity, Social Connections, Stress, Tobacco Use, Transportation Needs         Jabari Swan is a 69 y.o. male presenting today for follow-up after being discharged from the hospital 3 days ago. The main problem requiring admission was rehab after recent hospitalization. The discharge summary and/or Transitional Care Management documentation was reviewed. Medication reconciliation was performed as indicated via the \"Edgardo as Reviewed\" timestamp.     Jabari Swan was contacted by Transitional Care Management services two days after his discharge. This encounter and supporting documentation was reviewed.    Here with his wife after recent weeklong hospitalization with left leg recurrent cellulitis requiring antibiotics-then 3 weeks in rehab discharged this last Friday    Since discharge home doing well eating.  They are concerned about the leg and recurrent cellulitis        Review of Systems    /70   Pulse 77   Temp 36.8 °C (98.2 °F)   Resp 16   Ht 1.829 m (6')   Wt 93.5 kg (206 lb 3.2 oz)   SpO2 94%   BMI 27.97 kg/m²     Physical Exam  Constitutional:       Comments: Very pleasant male in a wheelchair in no distress  Eye exam revealed pupils equally round and reactive, with extraocular muscles intact. Normal sclera and eyelids.  Neck exam revealed no masses, adenopathy or thyromegaly. No neck pain on range of motion was noted.  Pulmonary exam revealed clear breath sounds bilaterally in all lung fields. No wheezes bronchitis or rales noted.  Cardiac exam revealed regular rate and rhythm without murmurs gallops or rubs.  No back flank or abdominal discomfort    Neurologic exam at baseline-he moved all extremities " though he had limited fine motor movement in his hands.    Gait not tested today    Mental status at baseline-quite interactive participating in discussion    Examination of his legs-right foot amputation at the midfoot without any worrisome findings.    Left foot with multiple partial toe amputations.    Medial calf stasis pigmentation with slight erythema with evidence of follicular irritation but no obvious cellulitis changes         The complexity of medical decision making for this patient's transitional care is high.    Assessment/Plan   Problem List Items Addressed This Visit             ICD-10-CM    Chronic kidney disease N18.9    Relevant Orders    Basic Metabolic Panel (Completed)    Essential hypertension with goal blood pressure less than 140/90 I10    Hypothyroidism E03.9    Stasis edema with inflammation I87.329    PVD (peripheral vascular disease) (CMS/[facility]) I73.9     Other Visit Diagnoses         Codes    Venous stasis dermatitis of both lower extremities    -  Primary I87.2    Relevant Medications    triamcinolone (Kenalog) 0.1 % cream    mupirocin (Bactroban) 2 % ointment    P-ANCA titer positive     R76.8    Relevant Orders    ANCA-Associated Vasculitis Profile (ANCA,MPO,PR3) (Completed)    Mild anemia     D64.9    Relevant Orders    CBC (Completed)    Folate (Completed)    Vitamin B12 (Completed)    Iron and TIBC (Completed)            We spoke at length, over 50 minutes regarding his recent hospitalizations his rehab discharge and present home care plan    Care planning-his wife is extraordinarily helpful-she was here today during the visit.  She will continue her remarkable efforts and contact me by phone or [employer] messaging with any additional concerns.  We spoke at length today regarding home issues including his medication, and other home concerns accordingly.    S/p hospitalization with cellulitis 2/10/2024, discharged 2/16/2024 into rehab.  He was discharged from rehab 3 days ago, on  Friday, 3/8/2024 with left leg cellulitis requiring cephalexin    Recurrent stasis cellulitis-mupirocin refilled.  His left compression hose is extraordinarily difficult.  We try to get this saw and it was a great struggle.  I strongly encouraged him to work with home supply service care to get zippered compression hose.  He is scheduled to see his dermatologist, Dr. Villalobos 3/20/2024 and his podiatrist this next week      Stasis dermatitis longstanding lymphedema/stasis cellulitis concerns -               Improved presently. He is on a stronger compression-he is able to put his socks on himself each morning. Uses triamcinolone cream and CeraVe -as recommended by the dermatologist, Dr. Villalobos who they will see as needed           Vascular surgery reevaluation 7/23 after ER visit.  Ultrasound completed       Edema -longstanding-stable, compression hose daily, also uses a pump in the evening, continue Lasix as directed - this was recently decreased from twice daily by nephrology. Patient encouraged to get a scale so that he can monitor weight at home and keep log.    His wife states that his nephrologist suggested reducing the furosemide a bit-they will take 1 tablet Monday Wednesday Friday, 2 tablets on the other days and start doing weights at home    History of recurrent Left foot cellulitis /multiple amputations- he will try to keep this open to the air during the day to help keep this dry between the toes. He will see his podiatrist, Dr. Thurston as planned, no recent problems with consistent care      Positive p-ANCA blood test-slightly elevated-additional labs ordered      S/p hospitalization 1/20/2024-1/26/2024 with acute hypoxic respiratory failure associated with RSV infection-now at home this week-clinically improved though he still has a significant cough with postnasal drip.  He did have a fiberoptic bronchoscopy the day of discharge. oxygen saturations at home typically in the 94 to 95% though a bit lower  this morning.  His wife has been very attentive to his pulmonary and congestion medications.  Additionally he has been on doxycycline.  His albuterol nebulizers initially were 4 times daily now 3 times daily steroid taper and will continue.  She will continue the Mucinex maximum strength DM with ample fluids twice daily    Pulmonology care-he is scheduled to follow-up with Dr. So February 2/18/2024 in pulmonology this month-PFTs and a sleep study have been planned, following his bronchoscopy 1/26/2024 while admitted.    Postnasal drip-he will continue the Flonase twice daily and restart Zyrtec twice daily.    Home health care-he will continue home nursing care-who came on Sunday.    S/p overnight hospitalization 12/10/2023 with dizziness and bradycardia after a bowel movement-rather than vasovagal this was attributed to metoprolol effects.  Metoprolol has been discontinued and he has no subsequent symptoms.          Early January 2024-Home blood pressure log with systolic values typically between 1 25-1 35 systolic, with pulse in the low 60 range.  He is scheduled to see Dr. Foley in electrocardiology 2/2/2024.  His wife will continue to follow blood pressure and pulse and take that data in with their visit in 3 weeks.    S/p ER visit 7/7/2023 for right leg cellulitis concern-the ER placed him on Keflex.  He saw vascular surgery-stasis congestion suspected-venous ultrasound has been ordered-plan for tomorrow    S/p  hospitalization-with acute stay March 5 through 8, followed by rehab March 8 to 19.  At home again.  He will continue ongoing home exercises and therapy as needed fortunately improved     P. A-Fib/Arrhythmia - cardiologist, Dr. Foley has started metoprolol 25 mg one time a day. No dizziness.  Stable clinically from a rhythm standpoint. He will continue his medications and cardiology follow-up            Stable, saw Dr. Foley Nov '22. Next appt Nov '23     Pulmonary hypertension - echo April 2022  did show mild to moderate pulmonary hypertension but his cardiologist, did not feel that this was an issue. Otherwise normal EF without worrisome valvular findings on that echo     Bradycardia - discussed with wife in detail-although his heart rate gets into the high 40 range sometime he is not symptomatic. They reviewed this with Dr. Carnes who suggested reducing metoprolol succinate 25-1/2 tablet daily. Pulse rate has increased from the high 40s up into the 50 range. Recently between 55-64          1/24-off metoprolol pulse in the low 60 range     hypertension/chronic kidney disease-he will continue medications and nephrology follow-up with Dr. Sahu's successor, Dr. Contreras. He'll see her semiannually.             They will follow-up with Dr. Gutierrez semiannually             January 2024-blood pressure and pulse remain improved/stable off low-dose metoprolol.  Labs have been done for the visit with the nephrologist next week        Parkinson's - not problematic, without noticeable tremor with medications per Dr. Peters in neurology- just saw last month; cont. semiannual visits. Overall doing well from this standpoint. To see Dr. Peters semiannually. Tremor not obvious today and exam. Doing well presently. semiannual visits planned. Tolerating medications. No recent concerns stable course.   He will see Dr. Lyle annually -each October. Clinically stable    Multiple medications-patient is on multiple medications. Medication list reviewed with the patient and reconciled and updated in the EMR accordingly. Ongoing efforts to minimize the number of medications medications as well as optimize dosing and compliance continues to be an ongoing priority.          1/24-medication list reviewed       Remote intracranial bleed/right-sided hemiparesis/gait unsteadiness - stable course. He's functionally independent for the most part at home. His wife continues to be extremely supportive accordingly. He remained stable  fortunately   He will continue caution ambulating and continue exercises and stretches to maintain strength stamina as well as ambulation conditioning. He uses a walker at home typically fortunately no recent falls. Physical therapy continues on an as-needed basis     Hypothyroidism - we'll continue to follow thyroid labs semi-annually   Sept '22 TSH normal, continue present regimen.           1/24 TSH pending     Prediabetes - monitoring on semiannual basis   Jan '22, A1c 6.1%   Sept '22, A1c 6.1%, stable from prior, discussed weight loss efforts today.        1/24-A1c pending     Dyslipidemia/elevated weight -goal LDL under 100   Sept 2022 lipids slightly elevated but overall stable. Health lifestyle measures encouraged. He will continue to follow-up with cardiology (Dr. Foley) accordingly.    Postnasal drip-he will continue the Flonase and restart Zyrtec     History of Recurrent pneumonia/with hospitalization again with another rehabilitation stay-    Improved presently no shortness of breath. Occas cough. Using incentive spirometer a 3 times daily           Urinary incontinence/BPH/nephrolithiasis/hypogonadism will continue medications per urology - He follows with Dr. Srivastava in urology--    Vitamin D deficiency - encouraged patient to continue vitamin D daily as ordered. Will consider vitamin D level regularly.   Sept '22 vitamin D stable/normal.     Colon care/colon polyps - as above colonoscopy planned routinely at some point with Dr. Camacho off the Eliquis   He had multiple tubular adenomatous polyps on his 12/21 colonoscopy. Next in 3 years-December 2024.        Skin care/scalp psoriasis/eczema - per Dr. Amaya annually or as needed. UTD.      Dental care - continue every 6 months     Elevated IOP / Vision care/bilateral cataract/glasses -  visits continue. He will see Dr. Riley August 2021 to address his bilateral cataracts.           At this point the cataracts are not ready to be removed. They will  continue to see her regularly              S/p right laser procedure, left laser eye planned soon in Aug '23 for pressure concerns.                    Flu shot - each fall after Labor Day encouraged-10/23    COVID booster-11/23    Tdap booster-will be needed this spring    RSV vaccination-following RSV pneumonia late January 2024-will plan to get this later this spring    Shingrix series-completed 2018    Follow-up in 12 weeks, sooner as needed

## 2024-03-11 NOTE — PROGRESS NOTES
This patient has a follow up scheduled with PCP within 2 business days of DC on 3/11/2024.   This visit qualifies for TCM billing.    Moderately complex & follow-up within 14 days- bill [id number] for hospital follow up.   Highly complex and follow-up visit within 7 days-can bill [phone] for hospital follow up    *virtual follow up needs modifier added (95 or GT)   *AWV AND TCM CAN BE BILLED TOGETHER WITH 25 MODIFIE     Discharge facility:[facility]  Discharge diagnosis:Cellulitis  Admission date:2/10/2024  Discharge date: 2/16/2024    Discharge facility:[facility]   Discharge diagnosis:Cellulitis  Admission date:2/16/2024  Discharge date: 3/8/2024      PCP Appointment Date:3/11/2024  Specialist Appointment Date:   Hospital Encounter and Summary: Linked   See Discharge assessment below for further details

## 2024-03-11 NOTE — TELEPHONE ENCOUNTER
Lou from [facility] to get message to Dr. Rivera that they are open to home care services tomorrow for physical therapy.    If you need to reach back out call Lou at [phone]

## 2024-03-14 LAB
ANCA AB PATTERN SER IF-IMP: NORMAL
ANCA IGG TITR SER IF: NORMAL {TITER}
MYELOPEROXIDASE AB SER-ACNC: 2 AU/ML (ref 0–19)
PROTEINASE3 AB SER-ACNC: 10 AU/ML (ref 0–19)

## 2024-03-15 ENCOUNTER — TELEPHONE (OUTPATIENT)
Dept: PRIMARY CARE | Facility: CLINIC | Age: 69
End: 2024-03-15
Payer: MEDICARE

## 2024-03-15 NOTE — TELEPHONE ENCOUNTER
[place]/FAX: [phone]/received office notes from 3/11/24, but they need the notes signed and dated by the provider, even if it's an E-signature and date is fine. Please re-send information via fax to [facility]

## 2024-03-16 VITALS
OXYGEN SATURATION: 94 % | TEMPERATURE: 98.2 F | SYSTOLIC BLOOD PRESSURE: 136 MMHG | RESPIRATION RATE: 16 BRPM | WEIGHT: 206.2 LBS | DIASTOLIC BLOOD PRESSURE: 70 MMHG | BODY MASS INDEX: 27.93 KG/M2 | HEIGHT: 72 IN | HEART RATE: 77 BPM

## 2024-03-16 RX ORDER — FERROUS SULFATE 325(65) MG
325 TABLET, DELAYED RELEASE (ENTERIC COATED) ORAL
Qty: 90 TABLET | Refills: 3 | Status: SHIPPED | OUTPATIENT
Start: 2024-03-16 | End: 2025-03-16

## 2024-03-16 NOTE — RESULT ENCOUNTER NOTE
Jabari / Berta    Thanks for doing the labs.  I am glad that the p-ANCA recheck lab was normal.  Most of the other labs were normal.    The iron level was borderline low.  I think it would be wise for you to take an iron tablet daily.  I have sent this to your pharmacy.  We will continue to follow this down the road.  Please plan to recheck nonfasting labs in about 8 weeks using the iron therapy daily.  Paperwork is not needed for that blood work as the orders are in the computer.    Thanks for addressing this matter.  Please contact me with concerns.    Sincerely,  Everett Rivera MD

## 2024-03-28 ENCOUNTER — PATIENT OUTREACH (OUTPATIENT)
Dept: PRIMARY CARE | Facility: CLINIC | Age: 69
End: 2024-03-28
Payer: MEDICARE

## 2024-03-28 DIAGNOSIS — I48.0 PAROXYSMAL ATRIAL FIBRILLATION (MULTI): ICD-10-CM

## 2024-03-28 DIAGNOSIS — I87.2 VENOUS STASIS DERMATITIS OF BOTH LOWER EXTREMITIES: ICD-10-CM

## 2024-03-28 NOTE — PROGRESS NOTES
I called and spoke with the patient wife who stated that the patient was doing well.  The patient completed home PT and his wife will encourage home self exercises.  Medications reviewed.  Instructed to call with any questions or concerns.

## 2024-04-15 DIAGNOSIS — Z00.00 ENCOUNTER FOR GENERAL ADULT MEDICAL EXAMINATION WITHOUT ABNORMAL FINDINGS: ICD-10-CM

## 2024-04-15 DIAGNOSIS — G81.91 HEMIPARESIS, RIGHT (MULTI): ICD-10-CM

## 2024-04-15 RX ORDER — FOLIC ACID 1 MG/1
1 TABLET ORAL DAILY
Qty: 90 TABLET | Refills: 3 | Status: SHIPPED | OUTPATIENT
Start: 2024-04-15 | End: 2025-04-15

## 2024-04-25 ENCOUNTER — PATIENT OUTREACH (OUTPATIENT)
Dept: PRIMARY CARE | Facility: CLINIC | Age: 69
End: 2024-04-25
Payer: MEDICARE

## 2024-04-25 DIAGNOSIS — I48.0 PAROXYSMAL ATRIAL FIBRILLATION (MULTI): ICD-10-CM

## 2024-04-25 DIAGNOSIS — I87.2 VENOUS STASIS DERMATITIS OF BOTH LOWER EXTREMITIES: ICD-10-CM

## 2024-04-25 NOTE — PROGRESS NOTES
I called and spoke with the patient's wife who stated that the patient is doing well.  Patient denies any difficulty breathing.  He checks his o2 sat daily and it was 96% on room air today.  Per the wife his legs are healed.  No medication refills needed at this time.  Patient has a follow up with Dr Rivera on 5/16/2024.  Instructed to call with any questions or concerns.

## 2024-04-29 ENCOUNTER — LAB (OUTPATIENT)
Dept: LAB | Facility: LAB | Age: 69
End: 2024-04-29
Payer: MEDICARE

## 2024-04-29 DIAGNOSIS — I10 ESSENTIAL (PRIMARY) HYPERTENSION: Primary | ICD-10-CM

## 2024-04-29 DIAGNOSIS — R80.9 PROTEINURIA, UNSPECIFIED: ICD-10-CM

## 2024-04-29 DIAGNOSIS — E55.9 VITAMIN D DEFICIENCY, UNSPECIFIED: ICD-10-CM

## 2024-04-29 DIAGNOSIS — R73.09 ELEVATED GLUCOSE: ICD-10-CM

## 2024-04-29 DIAGNOSIS — D64.9 MILD ANEMIA: ICD-10-CM

## 2024-04-29 PROCEDURE — 36415 COLL VENOUS BLD VENIPUNCTURE: CPT

## 2024-04-29 PROCEDURE — 83540 ASSAY OF IRON: CPT

## 2024-04-29 PROCEDURE — 82306 VITAMIN D 25 HYDROXY: CPT

## 2024-04-29 PROCEDURE — 83735 ASSAY OF MAGNESIUM: CPT

## 2024-04-29 PROCEDURE — 85025 COMPLETE CBC W/AUTO DIFF WBC: CPT

## 2024-04-29 PROCEDURE — 83036 HEMOGLOBIN GLYCOSYLATED A1C: CPT

## 2024-04-29 PROCEDURE — 80069 RENAL FUNCTION PANEL: CPT

## 2024-04-29 PROCEDURE — 81001 URINALYSIS AUTO W/SCOPE: CPT

## 2024-04-29 PROCEDURE — 84550 ASSAY OF BLOOD/URIC ACID: CPT

## 2024-04-29 PROCEDURE — 83550 IRON BINDING TEST: CPT

## 2024-04-30 ENCOUNTER — TELEPHONE (OUTPATIENT)
Dept: PRIMARY CARE | Facility: CLINIC | Age: 69
End: 2024-04-30
Payer: MEDICARE

## 2024-04-30 DIAGNOSIS — D64.9 MILD ANEMIA: Primary | ICD-10-CM

## 2024-04-30 DIAGNOSIS — R73.09 ELEVATED GLUCOSE: ICD-10-CM

## 2024-04-30 LAB
25(OH)D3 SERPL-MCNC: 51 NG/ML (ref 30–100)
ALBUMIN SERPL BCP-MCNC: 3.8 G/DL (ref 3.4–5)
ANION GAP SERPL CALC-SCNC: 13 MMOL/L (ref 10–20)
APPEARANCE UR: CLEAR
BASOPHILS # BLD AUTO: 0.03 X10*3/UL (ref 0–0.1)
BASOPHILS NFR BLD AUTO: 0.4 %
BILIRUB UR STRIP.AUTO-MCNC: NEGATIVE MG/DL
BUN SERPL-MCNC: 16 MG/DL (ref 6–23)
CALCIUM SERPL-MCNC: 8.8 MG/DL (ref 8.6–10.6)
CHLORIDE SERPL-SCNC: 108 MMOL/L (ref 98–107)
CO2 SERPL-SCNC: 28 MMOL/L (ref 21–32)
COLOR UR: YELLOW
CREAT SERPL-MCNC: 0.83 MG/DL (ref 0.5–1.3)
EGFRCR SERPLBLD CKD-EPI 2021: >90 ML/MIN/1.73M*2
EOSINOPHIL # BLD AUTO: 0.18 X10*3/UL (ref 0–0.7)
EOSINOPHIL NFR BLD AUTO: 2.6 %
ERYTHROCYTE [DISTWIDTH] IN BLOOD BY AUTOMATED COUNT: 13.7 % (ref 11.5–14.5)
EST. AVERAGE GLUCOSE BLD GHB EST-MCNC: 114 MG/DL
GLUCOSE SERPL-MCNC: 114 MG/DL (ref 74–99)
GLUCOSE UR STRIP.AUTO-MCNC: NORMAL MG/DL
HBA1C MFR BLD: 5.6 %
HCT VFR BLD AUTO: 39.6 % (ref 41–52)
HGB BLD-MCNC: 12.6 G/DL (ref 13.5–17.5)
IMM GRANULOCYTES # BLD AUTO: 0.02 X10*3/UL (ref 0–0.7)
IMM GRANULOCYTES NFR BLD AUTO: 0.3 % (ref 0–0.9)
IRON SATN MFR SERPL: 18 % (ref 25–45)
IRON SERPL-MCNC: 61 UG/DL (ref 35–150)
KETONES UR STRIP.AUTO-MCNC: NEGATIVE MG/DL
LEUKOCYTE ESTERASE UR QL STRIP.AUTO: NEGATIVE
LYMPHOCYTES # BLD AUTO: 1.17 X10*3/UL (ref 1.2–4.8)
LYMPHOCYTES NFR BLD AUTO: 16.6 %
MAGNESIUM SERPL-MCNC: 1.88 MG/DL (ref 1.6–2.4)
MCH RBC QN AUTO: 29.4 PG (ref 26–34)
MCHC RBC AUTO-ENTMCNC: 31.8 G/DL (ref 32–36)
MCV RBC AUTO: 92 FL (ref 80–100)
MONOCYTES # BLD AUTO: 0.5 X10*3/UL (ref 0.1–1)
MONOCYTES NFR BLD AUTO: 7.1 %
MUCOUS THREADS #/AREA URNS AUTO: NORMAL /LPF
NEUTROPHILS # BLD AUTO: 5.14 X10*3/UL (ref 1.2–7.7)
NEUTROPHILS NFR BLD AUTO: 73 %
NITRITE UR QL STRIP.AUTO: NEGATIVE
NRBC BLD-RTO: 0 /100 WBCS (ref 0–0)
PH UR STRIP.AUTO: 6 [PH]
PHOSPHATE SERPL-MCNC: 3 MG/DL (ref 2.5–4.9)
PLATELET # BLD AUTO: 198 X10*3/UL (ref 150–450)
POTASSIUM SERPL-SCNC: 3.9 MMOL/L (ref 3.5–5.3)
PROT UR STRIP.AUTO-MCNC: NORMAL MG/DL
RBC # BLD AUTO: 4.29 X10*6/UL (ref 4.5–5.9)
RBC # UR STRIP.AUTO: NEGATIVE /UL
RBC #/AREA URNS AUTO: NORMAL /HPF
SODIUM SERPL-SCNC: 145 MMOL/L (ref 136–145)
SP GR UR STRIP.AUTO: 1.02
SQUAMOUS #/AREA URNS AUTO: NORMAL /HPF
TIBC SERPL-MCNC: 343 UG/DL (ref 240–445)
UIBC SERPL-MCNC: 282 UG/DL (ref 110–370)
URATE SERPL-MCNC: 7.2 MG/DL (ref 4–7.5)
UROBILINOGEN UR STRIP.AUTO-MCNC: NORMAL MG/DL
WBC # BLD AUTO: 7 X10*3/UL (ref 4.4–11.3)
WBC #/AREA URNS AUTO: NORMAL /HPF

## 2024-04-30 NOTE — TELEPHONE ENCOUNTER
Pt wanted to know if Dr. Rivera would like to check his A1C when he gets his blood work done and if so should he fast. Please advise

## 2024-05-01 NOTE — RESULT ENCOUNTER NOTE
Results reviewed. No urgent findings.  Will Review results in detail at upcoming office appointment scheduled soon.      Everett Rivera MD

## 2024-05-15 ENCOUNTER — TELEPHONE (OUTPATIENT)
Dept: PRIMARY CARE | Facility: CLINIC | Age: 69
End: 2024-05-15
Payer: MEDICARE

## 2024-05-15 NOTE — TELEPHONE ENCOUNTER
Fyi Patient's wife is unable to bring him on Friday 5/17 as she is having surgery. Scheduled pt for 5/22 9am

## 2024-05-16 ENCOUNTER — APPOINTMENT (OUTPATIENT)
Dept: PRIMARY CARE | Facility: CLINIC | Age: 69
End: 2024-05-16
Payer: MEDICARE

## 2024-05-17 ENCOUNTER — APPOINTMENT (OUTPATIENT)
Dept: PRIMARY CARE | Facility: CLINIC | Age: 69
End: 2024-05-17
Payer: MEDICARE

## 2024-05-22 ENCOUNTER — OFFICE VISIT (OUTPATIENT)
Dept: PRIMARY CARE | Facility: CLINIC | Age: 69
End: 2024-05-22
Payer: MEDICARE

## 2024-05-22 DIAGNOSIS — E03.9 ACQUIRED HYPOTHYROIDISM: ICD-10-CM

## 2024-05-22 DIAGNOSIS — R73.03 PREDIABETES: ICD-10-CM

## 2024-05-22 DIAGNOSIS — Z78.9 HEALTH CARE HOME, ACTIVE CARE COORDINATION: ICD-10-CM

## 2024-05-22 DIAGNOSIS — Z23 IMMUNIZATION DUE: ICD-10-CM

## 2024-05-22 DIAGNOSIS — I48.0 PAROXYSMAL ATRIAL FIBRILLATION (MULTI): ICD-10-CM

## 2024-05-22 DIAGNOSIS — I10 ESSENTIAL HYPERTENSION WITH GOAL BLOOD PRESSURE LESS THAN 140/90: ICD-10-CM

## 2024-05-22 DIAGNOSIS — Z71.85 IMMUNIZATION COUNSELING: ICD-10-CM

## 2024-05-22 DIAGNOSIS — Z99.89 WALKER AS AMBULATION AID: ICD-10-CM

## 2024-05-22 DIAGNOSIS — R35.1 BENIGN PROSTATIC HYPERPLASIA WITH NOCTURIA: ICD-10-CM

## 2024-05-22 DIAGNOSIS — I63.9 CEREBROVASCULAR ACCIDENT (CVA), UNSPECIFIED MECHANISM (MULTI): ICD-10-CM

## 2024-05-22 DIAGNOSIS — I87.323 CHRONIC VENOUS HYPERTENSION WITH INFLAMMATION INVOLVING BOTH SIDES: ICD-10-CM

## 2024-05-22 DIAGNOSIS — N40.1 BENIGN PROSTATIC HYPERPLASIA WITH NOCTURIA: ICD-10-CM

## 2024-05-22 DIAGNOSIS — I87.2 VENOUS STASIS DERMATITIS OF BOTH LOWER EXTREMITIES: Primary | ICD-10-CM

## 2024-05-22 DIAGNOSIS — I25.10 ATHEROSCLEROSIS OF NATIVE CORONARY ARTERY OF NATIVE HEART WITHOUT ANGINA PECTORIS: ICD-10-CM

## 2024-05-22 DIAGNOSIS — Z97.3 WEARS GLASSES: ICD-10-CM

## 2024-05-22 DIAGNOSIS — G20.A1 PARKINSON'S DISEASE WITHOUT DYSKINESIA OR FLUCTUATING MANIFESTATIONS (MULTI): ICD-10-CM

## 2024-05-22 PROCEDURE — 99215 OFFICE O/P EST HI 40 MIN: CPT | Performed by: INTERNAL MEDICINE

## 2024-05-22 PROCEDURE — 1159F MED LIST DOCD IN RCRD: CPT | Performed by: INTERNAL MEDICINE

## 2024-05-22 PROCEDURE — 3078F DIAST BP <80 MM HG: CPT | Performed by: INTERNAL MEDICINE

## 2024-05-22 PROCEDURE — 3075F SYST BP GE 130 - 139MM HG: CPT | Performed by: INTERNAL MEDICINE

## 2024-05-22 PROCEDURE — G2211 COMPLEX E/M VISIT ADD ON: HCPCS | Performed by: INTERNAL MEDICINE

## 2024-05-22 PROCEDURE — 1160F RVW MEDS BY RX/DR IN RCRD: CPT | Performed by: INTERNAL MEDICINE

## 2024-05-22 PROCEDURE — 1123F ACP DISCUSS/DSCN MKR DOCD: CPT | Performed by: INTERNAL MEDICINE

## 2024-05-22 NOTE — PROGRESS NOTES
Subjective   Patient ID: Jabari Swan is a 69 y.o. male who presents for Follow-up and Diarrhea (X2 days).    Here with his wife Berta  For the most part doing well.  No recent ER visits-  Recently did labs  Recently saw his podiatrist as well  He did have a bout of watery diarrhea last week-that has since resolved         Review of Systems    Objective   /80   Pulse 71   Temp 36.8 °C (98.2 °F)   Ht 1.829 m (6')   Wt 98.9 kg (218 lb)   SpO2 97%   BMI 29.57 kg/m²     Physical Exam  Constitutional:       Comments: Very pleasant male in a wheelchair in no distress, overweight  Eye exam revealed pupils equally round and reactive, with extraocular muscles intact. Normal sclera and eyelids.  Neck exam revealed no masses, adenopathy or thyromegaly. No neck pain on range of motion was noted.  Pulmonary exam revealed clear breath sounds bilaterally in all lung fields. No wheezes bronchitis or rales noted.  Cardiac exam revealed regular rate and rhythm without murmurs gallops or rubs.  No back flank or abdominal discomfort  Extremities with bilateral lymphedema-compression hose on.  Foot exam deferred today  Neurologic exam-both arms with normal strength, left leg with normal strength, right leg with diminished strength with straight leg raising  Gait not tested  Mental status at baseline-slightly flattened-otherwise appropriate range of motions-smiled at times interactive with conversation         Assessment/Plan   Problem List Items Addressed This Visit             ICD-10-CM    Atherosclerosis of coronary artery of native heart I25.10    Benign enlargement of prostate N40.0    Essential hypertension with goal blood pressure less than 140/90 I10    Hypothyroidism E03.9    Parkinson's disease (Multi) G20.A1    Paroxysmal atrial fibrillation (Multi) I48.0    Prediabetes R73.03    Stasis edema with inflammation I87.329    Walker as ambulation aid Z99.89    Wears glasses Z97.3    RESOLVED: Stroke (Multi) I63.9     Health care home, active care coordination Z78.9    Venous stasis dermatitis of both lower extremities - Primary I87.2     Other Visit Diagnoses         Codes    Immunization due     Z23    Immunization counseling     Z71.85             Portions of this encounter note have been copied from my previous note dated 3/11/24  , which have been updated where appropriate and all reflect my current medical decision making from today.     We spoke for over 40 minutes, over half the time counseling    Home situation-he lives with his wife.  He has been debilitated since his CVA years ago.  He does not drive.  They live in a split-level house-so he is living on 3 levels.  He has a walker on each level.  He has banisters up and down both sets of stairs.  He does not need any help in the bathroom-they have a shower seat.  Although his wife does all the shopping and most of the housework, Jabari can vacuum    Care planning-his wife is extraordinarily helpful-she was here today during the visit.  She will continue her remarkable efforts and contact me by phone or mTrakst messaging with any additional concerns.  We spoke at length today regarding home issues including his medication, and other home concerns accordingly.    Gait unsteadiness/history of falls-he will use caution with his walker in his split-level house.  They have a lot of handles in the bathrooms and a shower seat along with other adaptive measures to help including banisters up both sides with the 2 sets of stairs.    Exercise routine-is really not doing much.  Encouraged chair exercises 5 days weekly with either home video or exercise routine    S/p RSV pneumonia-improved presently.  He will look to get the RSV vaccination later this fall    S/p hospitalization with cellulitis 2/10/2024, discharged 2/16/2024 into rehab.  He was discharged from rehab 3 days ago, on Friday, 3/8/2024 with left leg cellulitis requiring cephalexin    Recurrent stasis  cellulitis-mupirocin refilled.  His left compression hose is extraordinarily difficult.  We try to get this saw and it was a great struggle.  I strongly encouraged him to work with home supply service care to get zippered compression hose.  He is scheduled to see his dermatologist, Dr. Villalobos 3/20/2024 and his podiatrist this next week      Stasis dermatitis longstanding lymphedema/stasis cellulitis concerns -               Improved presently. He is on a stronger compression-he is able to put his socks on himself each morning. Uses triamcinolone cream and CeraVe -as recommended by the dermatologist, Dr. Villalobos who they will see as needed           Vascular surgery reevaluation 7/23 after ER visit.  Ultrasound completed       Edema -longstanding-stable, compression hose daily, also uses a pump in the evening, continue Lasix as directed - this was recently decreased from twice daily by nephrology. Patient encouraged to get a scale so that he can monitor weight at home and keep log.    His wife states that his nephrologist suggested reducing the furosemide a bit-they will take 1 tablet Monday Wednesday Friday, 2 tablets on the other days and start doing weights at home    Podiatry care with history of recurrent Left foot cellulitis /multiple amputations- he will try to keep this open to the air during the day to help keep this dry between the toes.            He will see his podiatrist, Dr. Thurston every 12 wks., no recent problems with consistent care.  He saw him 4/26/2024      Positive p-ANCA blood test-slightly elevated-additional labs ordered              Follow-up labs nondiagnostic      S/p hospitalization 1/20/2024-1/26/2024 with acute hypoxic respiratory failure associated with RSV infection-now at home this week-clinically improved though he still has a significant cough with postnasal drip.  He did have a fiberoptic bronchoscopy the day of discharge. oxygen saturations at home typically in the 94 to 95% though  a bit lower this morning.  His wife has been very attentive to his pulmonary and congestion medications.  Additionally he has been on doxycycline.  His albuterol nebulizers initially were 4 times daily now 3 times daily steroid taper and will continue.  She will continue the Mucinex maximum strength DM with ample fluids twice daily    Pulmonology care-he was scheduled to follow-up with Dr. So February 2/18/2024 in pulmonology this month-PFTs and a sleep study have been planned, following his bronchoscopy 1/26/2024 while admitted.    Postnasal drip-he will continue the Flonase twice daily and restart Zyrtec twice daily.    Home health care-             Home health care presently has been concluded    S/p overnight hospitalization 12/10/2023 with dizziness and bradycardia after a bowel movement-rather than vasovagal this was attributed to metoprolol effects.  Metoprolol has been discontinued and he has no subsequent symptoms.          Early January 2024-Home blood pressure log with systolic values typically between 1 25-1 35 systolic, with pulse in the low 60 range.  He is scheduled to see Dr. Foley in electrocardiology 2/2/2024.  His wife will continue to follow blood pressure and pulse and take that data in with their visit in 3 weeks.    S/p ER visit 7/7/2023 for right leg cellulitis concern-the ER placed him on Keflex.  He saw vascular surgery-stasis congestion suspected-venous ultrasound has been ordered-plan for tomorrow    S/p  hospitalization-with acute stay March 5 through 8, followed by rehab March 8 to 19.  At home again.  He will continue ongoing home exercises and therapy as needed fortunately improved     P. A-Fib/Arrhythmia - cardiologist, Dr. Foley has started metoprolol 25 mg one time a day. No dizziness.  Stable clinically from a rhythm standpoint. He will continue his medications and cardiology follow-up            Stable, saw Dr. Foley  Nov '23.  Anticipate annual follow-up this  fall    Anticoagulation-chronic with Eliquis-no complications with this     Pulmonary hypertension - echo April 2022 did show mild to moderate pulmonary hypertension but his cardiologist, did not feel that this was an issue. Otherwise normal EF without worrisome valvular findings on that echo     Bradycardia - discussed with wife in detail-although his heart rate gets into the high 40 range sometime he is not symptomatic. They reviewed this with Dr. Carnes who suggested reducing metoprolol succinate 25-1/2 tablet daily. Pulse rate has increased from the high 40s up into the 50 range. Recently between 55-64          1/24-off metoprolol pulse in the low 60 range     hypertension/chronic kidney disease-he will continue medications and nephrology follow-up with Dr. Sahu's successor, Dr. Contreras. He'll see her semiannually.             They will follow-up with Dr. Gutierrez semiannually             January 2024-blood pressure and pulse remain improved/stable off low-dose metoprolol.  Labs have been done for the visit with the nephrologist next week        Parkinson's - not problematic, without noticeable tremor with medications per Dr. Peters in neurology- just saw last month; cont. semiannual visits. Overall doing well from this standpoint. To see Dr. Peters semiannually. Tremor not obvious today and exam. Doing well presently. semiannual visits planned. Tolerating medications. No recent concerns stable course.   He will see Dr. Lyle annually -each October. Clinically stable    Multiple medications-patient is on multiple medications. Medication list reviewed with the patient and reconciled and updated in the EMR accordingly. Ongoing efforts to minimize the number of medications medications as well as optimize dosing and compliance continues to be an ongoing priority.          5/24-medication list reviewed       Remote intracranial bleed/right-sided hemiparesis/gait unsteadiness - stable course. He's functionally  independent for the most part at home. His wife continues to be extremely supportive accordingly. He remained stable fortunately.  His arms have normal strength, as does his left leg.  His right leg remains partially paralyzed                He will continue caution ambulating and continue exercises and stretches to maintain strength stamina as well as ambulation conditioning. He uses a walker at home typically fortunately no recent falls. Physical therapy continues on an as-needed basis     Hypothyroidism - we'll continue to follow thyroid labs semi-annually   Sept '22 TSH normal, continue present regimen.           4/24-TSH stable     Prediabetes /elevated weight- monitoring on semiannual basis   Jan '22, A1c 6.1%   Sept '22, A1c 6.1%, stable from prior, discussed weight loss efforts today.        4/29/2024-A1c 5.6%, improved from 5.9%.  BMI 29.6     Dyslipidemia/elevated weight -goal LDL under 100   Sept 2022 lipids slightly elevated but overall stable. Health lifestyle measures encouraged. He will continue to follow-up with cardiology (Dr. Foley) accordingly.    Postnasal drip-he will continue the Flonase and restart Zyrtec     History of Recurrent pneumonia/with hospitalization again with another rehabilitation stay-    Improved presently no shortness of breath. Occas cough. Using incentive spirometer a 3 times daily     Anemia-iron deficiency concern-             Late April 2024-H&H 12.6 39.6-he will call his gastroenterologist to see if an EGD/colonoscopy is needed sooner than scheduled    History of acute renal failure-improved in hospital with hydration-creatinine 0.87 4/29/2024.  He follows with Ai Gutierrez in nephrology, in 3/24, and 5,24     Urinary incontinence/BPH/nephrolithiasis/hypogonadism will continue medications per urology - He follows with Dr. Srivastava in urology--    Vitamin D deficiency - encouraged patient to continue vitamin D daily as ordered. Will consider vitamin D level regularly.   Sept  '22 vitamin D stable/normal.     Colon care/colon polyps - as above colonoscopy planned routinely at some point with Dr. Camacho off the Eliquis   He had multiple tubular adenomatous polyps on his 12/21 colonoscopy. Next in 3 years-December 2024.        Skin care/scalp psoriasis/eczema - per Dr. Amaya annually or as needed. UTD.      Dental care - continue every 6 months     Elevated IOP / Vision care/bilateral cataract/glasses -  visits continue. He will see Dr. Riley August 2021 to address his bilateral cataracts.           At this point the cataracts are not ready to be removed. They will continue to see her regularly              S/p right laser procedure, left laser eye  in Aug '23 for pressure concerns.                    Flu shot - each fall after Labor Day encouraged-10/23    COVID booster-11/23    Tdap booster-will be needed this spring          5/24- ordered    RSV vaccination-following RSV pneumonia late January 2024-will plan to get this later this summer    Shingrix series-completed 2018    Follow-up in 4, sooner as needed

## 2024-05-26 VITALS
SYSTOLIC BLOOD PRESSURE: 132 MMHG | WEIGHT: 218 LBS | HEIGHT: 72 IN | TEMPERATURE: 98.2 F | HEART RATE: 71 BPM | OXYGEN SATURATION: 97 % | DIASTOLIC BLOOD PRESSURE: 80 MMHG | BODY MASS INDEX: 29.53 KG/M2

## 2024-05-26 PROBLEM — Z78.9 HEALTH CARE HOME, ACTIVE CARE COORDINATION: Status: ACTIVE | Noted: 2024-05-26

## 2024-05-26 PROBLEM — I87.2 VENOUS STASIS DERMATITIS OF BOTH LOWER EXTREMITIES: Status: ACTIVE | Noted: 2024-05-26

## 2024-05-28 ENCOUNTER — PATIENT OUTREACH (OUTPATIENT)
Dept: PRIMARY CARE | Facility: CLINIC | Age: 69
End: 2024-05-28
Payer: MEDICARE

## 2024-05-28 DIAGNOSIS — E61.1 IRON DEFICIENCY: ICD-10-CM

## 2024-05-28 DIAGNOSIS — I48.0 PAROXYSMAL ATRIAL FIBRILLATION (MULTI): ICD-10-CM

## 2024-05-28 NOTE — PROGRESS NOTES
I received a return call from the patients wife Berta.  Per Berta the patient is doing well.  He is having an EGD/colonoscopy for iron deficiency anemia on 6/13/2024.  Patient denies any difficulties breathing.  SPO2 95-97%.  No medication refills needed at this time.  Instructed to call with any questions or concerns.

## 2024-05-29 DIAGNOSIS — E03.9 ACQUIRED HYPOTHYROIDISM: ICD-10-CM

## 2024-05-29 RX ORDER — LEVOTHYROXINE SODIUM 25 UG/1
25 TABLET ORAL EVERY MORNING
Qty: 90 TABLET | Refills: 1 | Status: SHIPPED | OUTPATIENT
Start: 2024-05-29

## 2024-06-04 ENCOUNTER — TELEPHONE (OUTPATIENT)
Dept: PRIMARY CARE | Facility: CLINIC | Age: 69
End: 2024-06-04
Payer: MEDICARE

## 2024-06-04 NOTE — TELEPHONE ENCOUNTER
Pt's spouse called and advised pt was in last week to see Dr. Rivera. Pt is scheduled for an endoscopy and colonoscopy for Friday 9/13/24 and his cardiologist approved for him to be off the Elequist for 2 days however was advise to call Dr. Rivera to be cleared for anesthesia and asked for a note to be sent to Dr. Puente's office. Call pt's spouse with questions

## 2024-06-06 NOTE — TELEPHONE ENCOUNTER
Please call spouse and advise if Dr. Rivera approved for pt to have procedure. Spouse advise the office has been calling her (Dr. Montesinos's office)

## 2024-06-13 ENCOUNTER — PATIENT OUTREACH (OUTPATIENT)
Dept: PRIMARY CARE | Facility: CLINIC | Age: 69
End: 2024-06-13
Payer: MEDICARE

## 2024-06-13 NOTE — PROGRESS NOTES
I spoke with the patient's wife today who stated that Jabari had his EDG/Colonoscopy today.  He has a few polyps removed and tolerated the procedure well.  The Dr ordered Linzess and a 90 day rx will cost 700$.  The wife is asking if there is any prescription plans to assist with the cost.  The patient also has the option of taking Metamucil daily which is affordable.  [facility]  The wife would also like financial assistance with Eliquis if possible.

## 2024-06-20 ENCOUNTER — APPOINTMENT (OUTPATIENT)
Dept: WOUND CARE | Facility: CLINIC | Age: 69
End: 2024-06-20
Payer: MEDICARE

## 2024-06-21 ENCOUNTER — PATIENT OUTREACH (OUTPATIENT)
Dept: PRIMARY CARE | Facility: CLINIC | Age: 69
End: 2024-06-21
Payer: MEDICARE

## 2024-06-21 DIAGNOSIS — I10 ESSENTIAL HYPERTENSION WITH GOAL BLOOD PRESSURE LESS THAN 140/90: ICD-10-CM

## 2024-06-21 DIAGNOSIS — I48.0 PAROXYSMAL ATRIAL FIBRILLATION (MULTI): ICD-10-CM

## 2024-06-21 RX ORDER — METRONIDAZOLE 250 MG/1
250 TABLET ORAL 4 TIMES DAILY
COMMUNITY
Start: 2024-06-19 | End: 2024-07-05

## 2024-06-21 RX ORDER — DOXYCYCLINE 100 MG/1
100 CAPSULE ORAL 2 TIMES DAILY
COMMUNITY
Start: 2024-06-19 | End: 2024-07-05

## 2024-06-21 RX ORDER — PANTOPRAZOLE SODIUM 40 MG/1
40 TABLET, DELAYED RELEASE ORAL 2 TIMES DAILY
COMMUNITY
Start: 2024-06-19 | End: 2024-07-05

## 2024-06-21 NOTE — PROGRESS NOTES
I received a call from the patients wife who stated that Jabari was diagnosed with H. Pylori by his biopsy results.  Patient was started on Protonix 40 mg BID, flagyl 250 mg QID, and doxycycline 100 mg BID for 2 weeks.  Patient is to follow up with GI is 6 weeks.

## 2024-06-25 ENCOUNTER — OFFICE VISIT (OUTPATIENT)
Dept: WOUND CARE | Facility: CLINIC | Age: 69
End: 2024-06-25
Payer: MEDICARE

## 2024-06-25 PROCEDURE — 99213 OFFICE O/P EST LOW 20 MIN: CPT

## 2024-07-02 ENCOUNTER — OFFICE VISIT (OUTPATIENT)
Dept: WOUND CARE | Facility: CLINIC | Age: 69
End: 2024-07-02
Payer: MEDICARE

## 2024-07-17 ENCOUNTER — PATIENT OUTREACH (OUTPATIENT)
Dept: PRIMARY CARE | Facility: CLINIC | Age: 69
End: 2024-07-17
Payer: MEDICARE

## 2024-07-17 DIAGNOSIS — I48.0 PAROXYSMAL ATRIAL FIBRILLATION (MULTI): ICD-10-CM

## 2024-07-17 DIAGNOSIS — I10 ESSENTIAL HYPERTENSION WITH GOAL BLOOD PRESSURE LESS THAN 140/90: ICD-10-CM

## 2024-07-17 NOTE — PROGRESS NOTES
I spoke with the patients wife who stated that Jabari is doing well.  No change in his health since we last spoke.  His leg is healed.  No medication refills needed at this time.  Wait to hear from pharmacy is Dr Rivera will order Eliquis.  Instructed to call with any questions or concerns.

## 2024-07-24 DIAGNOSIS — I48.0 PAROXYSMAL ATRIAL FIBRILLATION (MULTI): Primary | ICD-10-CM

## 2024-08-02 ENCOUNTER — APPOINTMENT (OUTPATIENT)
Dept: PHARMACY | Facility: HOSPITAL | Age: 69
End: 2024-08-02
Payer: MEDICARE

## 2024-08-02 DIAGNOSIS — I48.0 PAROXYSMAL ATRIAL FIBRILLATION (MULTI): Primary | ICD-10-CM

## 2024-08-02 RX ORDER — BISMUTH SUBSALICYLATE 262 MG/1
524 TABLET ORAL 4 TIMES DAILY
COMMUNITY
Start: 2024-06-19

## 2024-08-02 NOTE — PROGRESS NOTES
Patient ID: Jabari Swan is a 69 y.o. male who presents for Atrial Fibrillation.    Referring Provider: Everett Rivera MD  PCP: Everett Rivera MD Last visit with PCP: 05/22/2024 Next visit with PCP: 09/13/2024    Subjective   Treatment Adherence:   Number of missed doses in last 7 days: 0.       Preferred pharmacy:     [facility] - [place] - [facility] AT [facility]  [place] [address]  Phone: [phone] Fax: [phone]    [facility]/pharmacy #[id number] - [place], [place] - [address] AT [facility] [address]  [address]  [facility] [address]  Phone: [phone] Fax: [phone]    Replaced by [facility]  [address]  [facility] [address]  Phone: [phone] Fax: [phone]      Can patient afford prescribed medications: No, notes that the Eliquis has been expensive through insurance     HPI  ATRIAL FIBRILLATION paroxysmal   Does patient follow with cardiology? Yes - follows with Dr. Foley at [facility]  Last cardiology appointment: 06/17/24    Diagnosis:  Onset: At least 6-7 years ago per patient's wife  No specific time frame for anticoagulation was listed in recent cardiology notes, patient's wife states that Dr. Foley wants him to be on it for now    [id number] Score: 4  Age 65-74: 1 points  Sex Male: 0 points  CHF history: No - 0 points  Hypertension history: Yes - 1 point  Stroke/TIA/thromboembolism history: Yes - 2 points, per cardiology note patient has history of stroke  Vascular disease history (prior MI, peripheral artery disease, aortic plaque): No - 0 points   Diabetes history: No - 0 points     Pertinent Medical History  Medical history: Stroke,   Social history: None pertinent   Alcohol Abuse, High Risk Profession  Medication history: may rarely take OTC Advil/Motrin/Alleve     Current atrial fibrillations medications include:  Rate Control: None  Anticoagulation:  Eliquis 5 mg twice daily     Screening for dose adjustment:  No dose change recommended at this time    Monitoring:  Date of last BMP/CMP: 04/29/24  Last echo: 05/25/22  Recent Hospitalizations: Last hospitalization February 2024 for Cellulitis   Recent Falls/Trauma: None  Changes in Tobacco or Alcohol Intake: None   Patient and wife deny signs/symptoms of bleeding currently including blood in urine/stool and/or bruising       Objective     BP Readings from Last 4 Encounters:   05/26/24 132/80   03/16/24 136/70   01/31/24 143/63   01/05/24 158/76      Labs  Lab Results   Component Value Date    BILITOT 0.7 07/03/2021    CALCIUM 8.8 04/29/2024    CO2 28 04/29/2024     (H) 04/29/2024    CREATININE 0.83 04/29/2024    GLUCOSE 114 (H) 04/29/2024    ALKPHOS 68 07/03/2021    K 3.9 04/29/2024    PROT 7.0 07/03/2021     04/29/2024    AST 12 07/03/2021    ALT 4 (L) 09/24/2022    BUN 16 04/29/2024    ANIONGAP 13 04/29/2024    MG 1.88 04/29/2024    PHOS 3.0 04/29/2024    ALBUMIN 3.8 04/29/2024    GFRMALE >90 03/31/2023     Lab Results   Component Value Date    TRIG 105 09/24/2022    CHOL 158 09/24/2022    HDL 35.0 (A) 09/24/2022     Lab Results   Component Value Date    HGBA1C 5.6 04/29/2024     Medication Reconciliation:   Added:   Bismuth subsalicylate 262 mg 4 times a day as needed  Removed:   Fluocinolone 0.01% oil (duplicate entry)  Mupirocin 2% oint (therapy completed)  Pantoprazole 40 mg (therapy completed)     Current Outpatient Medications   Medication Instructions    amLODIPine (NORVASC) 10 mg, oral, Daily, for blood pressure    apixaban (ELIQUIS) 5 mg, oral, Every 12 hours    bismuth subsalicylate (PEPTO BISMOL) 524 mg, oral, 4 times daily    carbidopa-levodopa (Sinemet)  mg tablet 2 tablets, oral, 3 times daily    cetirizine (ZYRTEC) 10 mg, oral, Daily PRN    clobetasoL 0.05 % shampoo Shampoo 2-3 times a week affected areas as directed.    cyanocobalamin (Vitamin B-12) 500 mcg tablet 1 tablet,  oral, Daily    ergocalciferol (VITAMIN D-2) 50,000 Units, oral, Every 14 days    ferrous sulfate 325 (65 Fe) MG EC tablet 1 tablet, oral, Daily with breakfast, For iron deficiency    fluocinolone and shower cap 0.01 % oil APPLY AT NIGHT 2-3 X A WEEK AND WASH OUT IN THE MORNING    fluticasone (Flonase) 50 mcg/actuation nasal spray 2 sprays, Each Nostril, Nightly PRN    folic acid (FOLVITE) 1 mg, oral, Daily    furosemide (LASIX) 20 mg, oral, 2 times daily    levothyroxine (SYNTHROID, LEVOXYL) 25 mcg, oral, Every morning, Take before a meal    losartan (COZAAR) 50 mg, oral, Daily    pravastatin (PRAVACHOL) 20 mg, oral, Every evening    tamsulosin (FLOMAX) 0.4 mg, oral, Every evening    triamcinolone (Kenalog) 0.1 % cream Topical, 2 times daily, to affected area       Drug Interactions;  None requiring intervention at this time     Patient Assistance Screening (VAF)  Patient verbally reports monthly or yearly income which is less than 400% federal poverty level  Application for program has been submitted for the following medications:   Eliquis 5 mg tablets   Patient aware this process may take up to 2 weeks once income documents have been sent to the team.  If approved, medication must be filled through [facility] pharmacy and may be picked up or mailed to patient.   If approved, medication will be billed through insurance, and patient assistance team will pay the copay. This will result in a $0 copay for the patient.  Counseled patient on mechanism of action, side effects, contraindications, and what to do if the patient misses a dose. All patients questions were answered.        Assessment/Plan   Problem List Items Addressed This Visit             ICD-10-CM    Paroxysmal atrial fibrillation (Multi) - Primary I48.0     ASSESSMENT OF ANTICOAGULATION  Patient is to be on anticoagulation as determined by patient's cardiologist at [facility]   Rationale for plan:   Patient and wife report that Eliquis is fairly expensive  through their insurance and based on verbal discussion patient and wife may qualify for patient assistance  Prescription submitted to [facility] for program   Current dose of Eliquis is appropriate     Medication Changes:  Continue  Eliquis 5 mg 1 tablet by mouth every 12 hours     Monitoring and Education Discussed:  Counseled patient of side effects that are indicative of bleeding such as dark tarry stool, unexplainable bruising, or vomiting up a coffee ground like substance  Reminded patient of importance of anticoagulation therapy to prevent risk of heart attack or stroke  Counseled patient on MOA, expectations, duration of therapy, contraindications, administration, and monitoring parameters   Answered all patient questions and concerns     I will contact patient and wife once a determination has been made. They were encouraged to reach out with any questions and/or concerns.          Relevant Medications    apixaban (Eliquis) 5 mg tablet     Pharmacy Follow-up: ~11 months for patient assistance renewal   PCP Follow-up: 09/13/2024  Cardiology Follow-up: 06/18/2024     Time spent with pt: Total length of time 20 (minutes) of the encounter and more than 50% was spent counseling the patient.    Ricardo Clay, PharmD    Continue all meds under the continuation of care with the referring provider and clinical pharmacy team.

## 2024-08-02 NOTE — ASSESSMENT & PLAN NOTE
ASSESSMENT OF ANTICOAGULATION  Patient is to be on anticoagulation as [facility]   Rationale for plan:   Patient and wife report that Eliquis is fairly expensive through their insurance and based on verbal discussion patient and wife may qualify for patient assistance  Prescription submitted to [facility] for program   Current dose of Eliquis is appropriate     Medication Changes:  Continue  Eliquis 5 mg 1 tablet by mouth every 12 hours     Monitoring and Education Discussed:  Counseled patient of side effects that are indicative of bleeding such as dark tarry stool, unexplainable bruising, or vomiting up a coffee ground like substance  Reminded patient of importance of anticoagulation therapy to prevent risk of heart attack or stroke  Counseled patient on MOA, expectations, duration of therapy, contraindications, administration, and monitoring parameters   Answered all patient questions and concerns     I will contact patient and wife once a determination has been made. They were encouraged to reach out with any questions and/or concerns.

## 2024-08-05 ENCOUNTER — TELEPHONE (OUTPATIENT)
Dept: PHARMACY | Facility: HOSPITAL | Age: 69
End: 2024-08-05
Payer: MEDICARE

## 2024-08-05 NOTE — TELEPHONE ENCOUNTER
Jabari Swan is a 69 y.o. male who was referred to the Clinical Pharmacy Team through [facility] referral for cost assistance regarding his eliquis.     Unfortunately, at this time, Jabari Swan is ineligible to receive financial assistance through  Patient Assistance Program because family income is above the qualification limits. They also do not qualify for [employer] due to not meeting the income qualification limits.     Patient was notified of ineligibility and given the following options: encouraged to reach out to Dr. Foley if the Eliquis cost is not maintainable throughout the year    Referring provider will be notified of denial.     Patient's wife Berta was encouraged to reach out next year and we can re-assess if they qualify.     Please reach out with any questions and/or concerns. Prescription originally sent to [facility] has been cancelled.     Thank you,     Ricardo Clay, PharmD

## 2024-08-19 ENCOUNTER — OFFICE VISIT (OUTPATIENT)
Dept: PRIMARY CARE | Facility: CLINIC | Age: 69
End: 2024-08-19
Payer: MEDICARE

## 2024-08-19 VITALS
HEART RATE: 70 BPM | DIASTOLIC BLOOD PRESSURE: 70 MMHG | TEMPERATURE: 97.7 F | SYSTOLIC BLOOD PRESSURE: 130 MMHG | WEIGHT: 207 LBS | RESPIRATION RATE: 16 BRPM | OXYGEN SATURATION: 95 % | BODY MASS INDEX: 28.07 KG/M2

## 2024-08-19 DIAGNOSIS — I87.2 VENOUS STASIS DERMATITIS OF BOTH LOWER EXTREMITIES: ICD-10-CM

## 2024-08-19 DIAGNOSIS — I87.323 CHRONIC VENOUS HYPERTENSION WITH INFLAMMATION INVOLVING BOTH SIDES: ICD-10-CM

## 2024-08-19 DIAGNOSIS — E03.9 ACQUIRED HYPOTHYROIDISM: ICD-10-CM

## 2024-08-19 DIAGNOSIS — Z89.431 STATUS POST AMPUTATION OF RIGHT FOOT THROUGH METATARSAL BONE (MULTI): ICD-10-CM

## 2024-08-19 DIAGNOSIS — I87.2 VENOUS INSUFFICIENCY (CHRONIC) (PERIPHERAL): ICD-10-CM

## 2024-08-19 DIAGNOSIS — Z79.01 CHRONIC ANTICOAGULATION: ICD-10-CM

## 2024-08-19 DIAGNOSIS — G81.91 HEMIPARESIS, RIGHT (MULTI): ICD-10-CM

## 2024-08-19 DIAGNOSIS — I10 ESSENTIAL HYPERTENSION WITH GOAL BLOOD PRESSURE LESS THAN 140/90: Primary | ICD-10-CM

## 2024-08-19 DIAGNOSIS — I48.0 PAROXYSMAL ATRIAL FIBRILLATION (MULTI): ICD-10-CM

## 2024-08-19 PROCEDURE — 99214 OFFICE O/P EST MOD 30 MIN: CPT | Performed by: INTERNAL MEDICINE

## 2024-08-19 PROCEDURE — 1159F MED LIST DOCD IN RCRD: CPT | Performed by: INTERNAL MEDICINE

## 2024-08-19 PROCEDURE — 1036F TOBACCO NON-USER: CPT | Performed by: INTERNAL MEDICINE

## 2024-08-19 PROCEDURE — 1160F RVW MEDS BY RX/DR IN RCRD: CPT | Performed by: INTERNAL MEDICINE

## 2024-08-19 PROCEDURE — 3078F DIAST BP <80 MM HG: CPT | Performed by: INTERNAL MEDICINE

## 2024-08-19 PROCEDURE — 1123F ACP DISCUSS/DSCN MKR DOCD: CPT | Performed by: INTERNAL MEDICINE

## 2024-08-19 PROCEDURE — 3075F SYST BP GE 130 - 139MM HG: CPT | Performed by: INTERNAL MEDICINE

## 2024-08-19 RX ORDER — MUPIROCIN 20 MG/G
OINTMENT TOPICAL
Qty: 44 G | Refills: 2 | Status: SHIPPED | OUTPATIENT
Start: 2024-08-19

## 2024-08-19 RX ORDER — MUPIROCIN 20 MG/G
OINTMENT TOPICAL
Qty: 44 G | Refills: 2 | Status: SHIPPED | OUTPATIENT
Start: 2024-08-19 | End: 2024-08-19 | Stop reason: SDUPTHER

## 2024-08-19 RX ORDER — TRIAMCINOLONE ACETONIDE 1 MG/G
CREAM TOPICAL 2 TIMES DAILY
Qty: 454 G | Refills: 2 | Status: SHIPPED | OUTPATIENT
Start: 2024-08-19

## 2024-08-19 RX ORDER — TRIAMCINOLONE ACETONIDE 1 MG/G
CREAM TOPICAL 2 TIMES DAILY
Qty: 454 G | Refills: 2 | Status: SHIPPED | OUTPATIENT
Start: 2024-08-19 | End: 2024-08-19 | Stop reason: SDUPTHER

## 2024-08-19 NOTE — PROGRESS NOTES
Subjective   Patient ID: Jabari Swan is a 69 y.o. male who presents for Follow-up (Patient here for a follow up from the  for left foot injury ).    Here after urgent care visit 2 days ago, 8/17/2024.  His wife noticed that Friday night the area on his left foot appeared a bit more pink, but on Saturday morning it appeared more red.  No fevers or chills no real pain on either the left foot or left calf or right calf area         Review of Systems    Objective   /70   Pulse 70   Temp 36.5 °C (97.7 °F)   Resp 16   Wt 93.9 kg (207 lb)   SpO2 95%   BMI 28.07 kg/m²     Physical Exam  Constitutional:       Comments: Well-appearing male at baseline in no distress in a wheelchair  Eye exam revealed pupils equally round and reactive, with extraocular muscles intact. Normal sclera and eyelids.  Neck exam revealed no masses, adenopathy or thyromegaly. No neck pain on range of motion was noted.  Pulmonary exam revealed clear breath sounds bilaterally in all lung fields. No wheezes bronchitis or rales noted.  Cardiac exam revealed regular rate and rhythm without murmurs gallops or rubs.  Extremities-with compression hose removed-  He had an area on his right calf that was somewhat erythematous-more of a perimeter around a chronic anterior scar area likewise on his left calf more diffusely and on his left foot and a triangular patch of redness without warmth.  Left toes unremarkable.  Right foot with mid metatarsal amputations    Mental status at baseline     Musculoskeletal:        Feet:    Feet:      Comments: Area of erythema rather triangular and demarcated no purulence no discharge skin was unremarkable between his toes  Skin:            Comments: He had a perimeter of erythema on his right anterior calf around past surgical site, left calf was more diffuse         Assessment/Plan   Problem List Items Addressed This Visit             ICD-10-CM    Venous stasis dermatitis of both lower extremities I87.2     Relevant Medications    triamcinolone (Kenalog) 0.1 % cream    mupirocin (Bactroban) 2 % ointment        Portions of this encounter note have been copied from my previous note dated 5/22/24  , which have been updated where appropriate and all reflect my current medical decision making from today.     We spoke for over 30 minutes, over half the time counseling    Home situation-he lives with his wife.  He has been debilitated since his CVA years ago.  He does not drive.  They live in a split-level house-so he is living on 3 levels.  He has a walker on each level.  He has banisters up and down both sets of stairs.  He does not need any help in the bathroom-they have a shower seat.  Although his wife does all the shopping and most of the housework, Jabari can vacuum    Care planning-his wife is extraordinarily helpful-she was here today during the visit.  She will continue her remarkable efforts and contact me by phone or [employer] messaging with any additional concerns.  We spoke at length today regarding home issues including his medication, and other home concerns accordingly.    Bilateral calf and left foot erythema-we spoke at length regarding the need to continue to watch this.  He has been on a home skin care plan mainly with Aquaphor moisturizer but also intermittently with triamcinolone cream for the stasis dermatitis and irritation issues from his very tight compression hose.  His wife had used the triamcinolone cream once or twice last week but clearly there is some irritation on the right calf.  Will start with mupirocin twice daily to the areas that are red.  She will call in the next 72 hours with an update on his symptoms we will transition over the next week towards triamcinolone cream twice daily for 7 to 10 days and then return to Aquaphor.  They will continue daily feet exams and podiatry follow-up accordingly    Gait unsteadiness/history of falls-he will use caution with his walker in his split-level  house.  They have a lot of handles in the bathrooms and a shower seat along with other adaptive measures to help including banisters up both sides with the 2 sets of stairs.    Exercise routine-is really not doing much.  Encouraged chair exercises 5 days weekly with either home video or exercise routine    S/p RSV pneumonia-improved presently.  He will look to get the RSV vaccination later this fall    S/p hospitalization with cellulitis 2/10/2024, discharged 2/16/2024 into rehab.  He was discharged from rehab 3 days ago, on Friday, 3/8/2024 with left leg cellulitis requiring cephalexin    Recurrent stasis cellulitis-mupirocin refilled.  His left compression hose is extraordinarily difficult.  We try to get this saw and it was a great struggle.  I strongly encouraged him to work with home supply service care to get zippered compression hose.  He is scheduled to see his dermatologist, Dr. Villalobos 3/20/2024 and his podiatrist this next week      Stasis dermatitis longstanding lymphedema/stasis cellulitis concerns -               Improved presently. He is on a stronger compression-he is able to put his socks on himself each morning. Uses triamcinolone cream and CeraVe -as recommended by the dermatologist, Dr. Villalobos who they will see as needed           Vascular surgery reevaluation 7/23 after ER visit.  Ultrasound completed       Edema -longstanding-stable, compression hose daily, also uses a pump in the evening, continue Lasix as directed - this was recently decreased from twice daily by nephrology. Patient encouraged to get a scale so that he can monitor weight at home and keep log.    His wife states that his nephrologist suggested reducing the furosemide a bit-they will take 1 tablet Monday Wednesday Friday, 2 tablets on the other days and start doing weights at home    Podiatry care with history of recurrent Left foot cellulitis /multiple amputations- he will try to keep this open to the air during the day to help  keep this dry between the toes.            He will see his podiatrist, Dr. Thurston every 12 wks., no recent problems with consistent care.  He saw him 4/26/2024      Positive p-ANCA blood test-slightly elevated-additional labs ordered              Follow-up labs nondiagnostic      S/p hospitalization 1/20/2024-1/26/2024 with acute hypoxic respiratory failure associated with RSV infection-now at home this week-clinically improved though he still has a significant cough with postnasal drip.  He did have a fiberoptic bronchoscopy the day of discharge. oxygen saturations at home typically in the 94 to 95% though a bit lower this morning.  His wife has been very attentive to his pulmonary and congestion medications.  Additionally he has been on doxycycline.  His albuterol nebulizers initially were 4 times daily now 3 times daily steroid taper and will continue.  She will continue the Mucinex maximum strength DM with ample fluids twice daily    Pulmonology care-he was scheduled to follow-up with Dr. So February 2/18/2024 in pulmonology this month-PFTs and a sleep study have been planned, following his bronchoscopy 1/26/2024 while admitted.    Postnasal drip-he will continue the Flonase twice daily and restart Zyrtec twice daily.    Home health care-             Home health care presently has been concluded    S/p overnight hospitalization 12/10/2023 with dizziness and bradycardia after a bowel movement-rather than vasovagal this was attributed to metoprolol effects.  Metoprolol has been discontinued and he has no subsequent symptoms.          Early January 2024-Home blood pressure log with systolic values typically between 1 25-1 35 systolic, with pulse in the low 60 range.  He is scheduled to see Dr. Foley in electrocardiology 2/2/2024.  His wife will continue to follow blood pressure and pulse and take that data in with their visit in 3 weeks.    S/p ER visit 7/7/2023 for right leg cellulitis concern-the ER placed him  on Keflex.  He saw vascular surgery-stasis congestion suspected-venous ultrasound has been ordered-plan for tomorrow    S/p  hospitalization-with acute stay March 5 through 8, followed by rehab March 8 to 19.  At home again.  He will continue ongoing home exercises and therapy as needed fortunately improved     P. A-Fib/Arrhythmia - cardiologist, Dr. Foley has started metoprolol 25 mg one time a day. No dizziness.  Stable clinically from a rhythm standpoint. He will continue his medications and cardiology follow-up            Stable, saw Dr. Foley  Nov '23.  Anticipate annual follow-up this fall    Anticoagulation-chronic with Eliquis-no complications with this     Pulmonary hypertension - echo April 2022 did show mild to moderate pulmonary hypertension but his cardiologist, did not feel that this was an issue. Otherwise normal EF without worrisome valvular findings on that echo     Bradycardia - discussed with wife in detail-although his heart rate gets into the high 40 range sometime he is not symptomatic. They reviewed this with Dr. Carnes who suggested reducing metoprolol succinate 25-1/2 tablet daily. Pulse rate has increased from the high 40s up into the 50 range. Recently between 55-64          1/24-off metoprolol pulse in the low 60 range     hypertension/chronic kidney disease-he will continue medications and nephrology follow-up with Dr. Sahu's successor, Dr. Contreras. He'll see her semiannually.             They will follow-up with Dr. Gutierrez semiannually             January 2024-blood pressure and pulse remain improved/stable off low-dose metoprolol.  Labs have been done for the visit with the nephrologist next week             8/24-blood pressure improved        Parkinson's - not problematic, without noticeable tremor with medications per Dr. Peters in neurology- just saw last month; cont. semiannual visits. Overall doing well from this standpoint. To see Dr. Peters semiannually. Tremor not obvious  today and exam. Doing well presently. semiannual visits planned. Tolerating medications. No recent concerns stable course.   He will see Dr. Lyle annually -each October. Clinically stable    Multiple medications-patient is on multiple medications. Medication list reviewed with the patient and reconciled and updated in the EMR accordingly. Ongoing efforts to minimize the number of medications medications as well as optimize dosing and compliance continues to be an ongoing priority.          5/24-medication list reviewed       Remote intracranial bleed/right-sided hemiparesis/gait unsteadiness - stable course. He's functionally independent for the most part at home. His wife continues to be extremely supportive accordingly. He remained stable fortunately.  His arms have normal strength, as does his left leg.  His right leg remains partially paralyzed                He will continue caution ambulating and continue exercises and stretches to maintain strength stamina as well as ambulation conditioning. He uses a walker at home typically fortunately no recent falls. Physical therapy continues on an as-needed basis     Hypothyroidism - we'll continue to follow thyroid labs semi-annually   Sept '22 TSH normal, continue present regimen.           4/24-TSH stable     Prediabetes /elevated weight- monitoring on semiannual basis   Jan '22, A1c 6.1%   Sept '22, A1c 6.1%, stable from prior, discussed weight loss efforts today.        4/29/2024-A1c 5.6%, improved from 5.9%.  BMI 29.6     Dyslipidemia/elevated weight -goal LDL under 100   Sept 2022 lipids slightly elevated but overall stable. Health lifestyle measures encouraged. He will continue to follow-up with cardiology (Dr. Foley) accordingly.    Postnasal drip-he will continue the Flonase and restart Zyrtec     History of Recurrent pneumonia/with hospitalization again with another rehabilitation stay-    Improved presently no shortness of breath. Occas cough. Using  incentive spirometer a 3 times daily     Anemia-iron deficiency concern-             Late April 2024-H&H 12.6 39.6-he will call his gastroenterologist to see if an EGD/colonoscopy is needed sooner than scheduled    History of acute renal failure-improved in hospital with hydration-creatinine 0.87 4/29/2024.  He follows with Ai Gutierrez in nephrology, in 3/24, and 5,24     Urinary incontinence/BPH/nephrolithiasis/hypogonadism will continue medications per urology - He follows with Dr. Srivastava in urology--    Vitamin D deficiency - encouraged patient to continue vitamin D daily as ordered. Will consider vitamin D level regularly.   Sept '22 vitamin D stable/normal.     Colon care/colon polyps - as above colonoscopy planned routinely at some point with Dr. Camacho off the Eliquis   He had multiple tubular adenomatous polyps on his 12/21 colonoscopy. Next in 3 years-December 2024.        Skin care/scalp psoriasis/eczema - per Dr. Amaya annually or as needed. UTD.      Dental care - continue every 6 months     Elevated IOP / Vision care/bilateral cataract/glasses -  visits continue. He will see Dr. Riley August 2021 to address his bilateral cataracts.           At this point the cataracts are not ready to be removed. They will continue to see her regularly              S/p right laser procedure, left laser eye  in Aug '23 for pressure concerns.                    Flu shot - each fall after Labor Day encouraged-10/23    COVID booster-11/23    Tdap booster-will be needed this spring          5/24- ordered            8/24-encouraged    RSV vaccination-following RSV pneumonia late January 2024-will plan to get this later this summer             8/24-encouraged    Shingrix series-completed 2018    Follow-up in 4 months, sooner as needed-           As scheduled in several weeks in September    Charting was completed using voice recognition technology and may include unintended errors.

## 2024-08-20 PROBLEM — Z89.431: Status: ACTIVE | Noted: 2024-08-20

## 2024-08-20 PROBLEM — R93.89 ABNORMAL CHEST XRAY: Status: RESOLVED | Noted: 2023-03-25 | Resolved: 2024-08-20

## 2024-08-20 PROBLEM — S32.009A CLOSED FRACTURE OF LUMBAR VERTEBRA (MULTI): Status: RESOLVED | Noted: 2018-11-28 | Resolved: 2024-08-20

## 2024-08-20 PROBLEM — Z79.01 CHRONIC ANTICOAGULATION: Status: ACTIVE | Noted: 2024-08-20

## 2024-08-20 PROBLEM — R10.11 ABDOMINAL PAIN, RUQ (RIGHT UPPER QUADRANT): Status: RESOLVED | Noted: 2023-03-25 | Resolved: 2024-08-20

## 2024-08-20 PROBLEM — G54.0 THORACIC OUTLET SYNDROME: Status: RESOLVED | Noted: 2023-03-25 | Resolved: 2024-08-20

## 2024-08-26 DIAGNOSIS — N40.1 BENIGN PROSTATIC HYPERPLASIA WITH NOCTURIA: ICD-10-CM

## 2024-08-26 DIAGNOSIS — E78.5 DYSLIPIDEMIA: ICD-10-CM

## 2024-08-26 DIAGNOSIS — R35.1 BENIGN PROSTATIC HYPERPLASIA WITH NOCTURIA: ICD-10-CM

## 2024-08-27 RX ORDER — PRAVASTATIN SODIUM 20 MG/1
TABLET ORAL
Qty: 90 TABLET | Refills: 3 | Status: SHIPPED | OUTPATIENT
Start: 2024-08-27

## 2024-08-27 RX ORDER — TAMSULOSIN HYDROCHLORIDE 0.4 MG/1
CAPSULE ORAL
Qty: 90 CAPSULE | Refills: 3 | Status: SHIPPED | OUTPATIENT
Start: 2024-08-27

## 2024-09-13 ENCOUNTER — APPOINTMENT (OUTPATIENT)
Dept: PRIMARY CARE | Facility: CLINIC | Age: 69
End: 2024-09-13
Payer: MEDICARE

## 2024-09-13 VITALS
HEART RATE: 67 BPM | WEIGHT: 207 LBS | DIASTOLIC BLOOD PRESSURE: 80 MMHG | TEMPERATURE: 97.4 F | SYSTOLIC BLOOD PRESSURE: 134 MMHG | OXYGEN SATURATION: 98 % | HEIGHT: 72 IN | BODY MASS INDEX: 28.04 KG/M2

## 2024-09-13 DIAGNOSIS — Z23 ENCOUNTER FOR IMMUNIZATION: Primary | ICD-10-CM

## 2024-09-13 DIAGNOSIS — Z79.01 CHRONIC ANTICOAGULATION: ICD-10-CM

## 2024-09-13 DIAGNOSIS — E03.9 ACQUIRED HYPOTHYROIDISM: ICD-10-CM

## 2024-09-13 DIAGNOSIS — Z00.00 ROUTINE GENERAL MEDICAL EXAMINATION AT HEALTH CARE FACILITY: ICD-10-CM

## 2024-09-13 DIAGNOSIS — N40.1 BENIGN PROSTATIC HYPERPLASIA WITH NOCTURIA: ICD-10-CM

## 2024-09-13 DIAGNOSIS — E55.9 VITAMIN D DEFICIENCY: ICD-10-CM

## 2024-09-13 DIAGNOSIS — E78.5 DYSLIPIDEMIA: ICD-10-CM

## 2024-09-13 DIAGNOSIS — R73.03 PREDIABETES: ICD-10-CM

## 2024-09-13 DIAGNOSIS — R35.1 BENIGN PROSTATIC HYPERPLASIA WITH NOCTURIA: ICD-10-CM

## 2024-09-13 DIAGNOSIS — I48.0 PAROXYSMAL ATRIAL FIBRILLATION (MULTI): ICD-10-CM

## 2024-09-13 DIAGNOSIS — D64.9 MILD ANEMIA: ICD-10-CM

## 2024-09-13 PROCEDURE — 90662 IIV NO PRSV INCREASED AG IM: CPT | Performed by: INTERNAL MEDICINE

## 2024-09-13 PROCEDURE — 3079F DIAST BP 80-89 MM HG: CPT | Performed by: INTERNAL MEDICINE

## 2024-09-13 PROCEDURE — 1159F MED LIST DOCD IN RCRD: CPT | Performed by: INTERNAL MEDICINE

## 2024-09-13 PROCEDURE — 3008F BODY MASS INDEX DOCD: CPT | Performed by: INTERNAL MEDICINE

## 2024-09-13 PROCEDURE — G0008 ADMIN INFLUENZA VIRUS VAC: HCPCS | Performed by: INTERNAL MEDICINE

## 2024-09-13 PROCEDURE — 3075F SYST BP GE 130 - 139MM HG: CPT | Performed by: INTERNAL MEDICINE

## 2024-09-13 PROCEDURE — G0439 PPPS, SUBSEQ VISIT: HCPCS | Performed by: INTERNAL MEDICINE

## 2024-09-13 PROCEDURE — 1170F FXNL STATUS ASSESSED: CPT | Performed by: INTERNAL MEDICINE

## 2024-09-13 PROCEDURE — 1123F ACP DISCUSS/DSCN MKR DOCD: CPT | Performed by: INTERNAL MEDICINE

## 2024-09-13 PROCEDURE — 1160F RVW MEDS BY RX/DR IN RCRD: CPT | Performed by: INTERNAL MEDICINE

## 2024-09-13 PROCEDURE — 99214 OFFICE O/P EST MOD 30 MIN: CPT | Performed by: INTERNAL MEDICINE

## 2024-09-13 ASSESSMENT — ACTIVITIES OF DAILY LIVING (ADL)
GROOMING: INDEPENDENT
DRESSING: INDEPENDENT
TOILETING: INDEPENDENT
JUDGMENT_ADEQUATE_SAFELY_COMPLETE_DAILY_ACTIVITIES: YES
DOING_HOUSEWORK: INDEPENDENT
GROCERY_SHOPPING: NEEDS ASSISTANCE
MANAGING_FINANCES: NEEDS ASSISTANCE
ADEQUATE_TO_COMPLETE_ADL: YES
BATHING: INDEPENDENT
TAKING_MEDICATION: INDEPENDENT
FEEDING YOURSELF: INDEPENDENT
PATIENT'S MEMORY ADEQUATE TO SAFELY COMPLETE DAILY ACTIVITIES?: YES

## 2024-09-13 ASSESSMENT — PATIENT HEALTH QUESTIONNAIRE - PHQ9
SUM OF ALL RESPONSES TO PHQ9 QUESTIONS 1 AND 2: 0
2. FEELING DOWN, DEPRESSED OR HOPELESS: NOT AT ALL
1. LITTLE INTEREST OR PLEASURE IN DOING THINGS: NOT AT ALL

## 2024-09-13 ASSESSMENT — ENCOUNTER SYMPTOMS
DEPRESSION: 0
OCCASIONAL FEELINGS OF UNSTEADINESS: 1
LOSS OF SENSATION IN FEET: 0

## 2024-09-13 NOTE — ASSESSMENT & PLAN NOTE
Orders:    Comprehensive Metabolic Panel; Future    Lipid Panel; Future    TSH with reflex to Free T4 if abnormal; Future

## 2024-09-13 NOTE — PROGRESS NOTES
Subjective   Reason for Visit: Jabari Swan is an 69 y.o. male here for a Medicare Wellness visit.     Past Medical, Surgical, and Family History reviewed and updated in chart.         Here with his wife for wellness visit and follow-up  Doing better with his legs.  No recent concerns presently  Recently did the RSV          Patient Care Team:  Everett Rivera MD as PCP - General  Everett Rivera MD as PCP - [facility] Attributed Provider  Roseanne Kong RN as Care Manager (Case Management)     Review of Systems    Objective   Vitals:  /80   Pulse 67   Temp 36.3 °C (97.4 °F) (Skin)   Ht 1.829 m (6')   Wt 93.9 kg (207 lb)   SpO2 98%   BMI 28.07 kg/m²       Physical Exam  Vitals reviewed.   Constitutional:       Appearance: Normal appearance.      Comments: Very pleasant gentleman in a wheelchair in no distress   HENT:      Head: Normocephalic and atraumatic.   Eyes:      General: No scleral icterus.        Right eye: No discharge.         Left eye: No discharge.      Extraocular Movements: Extraocular movements intact.      Conjunctiva/sclera: Conjunctivae normal.      Pupils: Pupils are equal, round, and reactive to light.   Cardiovascular:      Rate and Rhythm: Normal rate and regular rhythm.      Pulses: Normal pulses.      Heart sounds: Normal heart sounds. No murmur heard.  Pulmonary:      Effort: Pulmonary effort is normal.      Breath sounds: Normal breath sounds. No wheezing or rhonchi.   Musculoskeletal:         General: No deformity or signs of injury. Normal range of motion.      Cervical back: Normal range of motion and neck supple. No rigidity or tenderness.      Comments: Leg exam deferred today   Lymphadenopathy:      Cervical: No cervical adenopathy.   Skin:     General: Skin is warm and dry.      Findings: No rash.   Neurological:      General: No focal deficit present.      Mental Status: He is alert and oriented to person, place, and time. Mental status is at baseline.      Cranial Nerves: No  Virtual Regular Visit      Assessment/Plan:    Problem List Items Addressed This Visit        Endocrine    Type 2 diabetes mellitus without complication, without long-term current use of insulin ([facility] [address] )       Lab Results   Component Value Date    HGBA1C 6 1 06/05/2020   not having hyperglycemia, or any sxs related to diabetes  She is off of meds  Cardiovascular and Mediastinum    Unspecified atrial fibrillation (HCC)     Condition is stable with current treatment, to  continue the same             Other Visit Diagnoses     Nausea    -  Primary    Relevant Medications    ondansetron (ZOFRAN) 4 mg tablet               Reason for visit is   Chief Complaint   Patient presents with    Virtual Regular Visit        Encounter provider Erick Hernandez MD    Provider located at [facility] AT [address]  [address] [place] [address]      Recent Visits  No visits were found meeting these conditions  Showing recent visits within past 7 days and meeting all other requirements     Today's Visits  Date Type Provider Dept   08/25/20 Telemedicine Erick Hernandez MD [facility]g Formerly named [facility] today's visits and meeting all other requirements     Future Appointments  No visits were found meeting these conditions  Showing future appointments within next 150 days and meeting all other requirements        The patient was identified by name and date of birth  Ariane Leonardo was informed that this is a telemedicine visit and that the visit is being conducted through [address] [place] [address] and patient was informed that this is not a secure, HIPAA-complaint platform  She agrees to proceed     My office door was closed  No one else was in the room  She acknowledged consent and understanding of privacy and security of the video platform  The patient has agreed to participate and understands they can discontinue the visit at any time      Patient cranial nerve deficit.      Sensory: No sensory deficit.      Gait: Gait normal.      Comments: Partial right hemiparesis noted   Psychiatric:         Mood and Affect: Mood normal.         Behavior: Behavior normal.         Thought Content: Thought content normal.         Judgment: Judgment normal.         Assessment & Plan  Encounter for immunization    Orders:    Flu vaccine, trivalent, preservative free, HIGH-DOSE, age 65y+ (Fluzone)    Routine general medical examination at health care facility    Orders:    1 Year Follow Up In [facility] - PCP - Wellness Exam; Future    Dyslipidemia    Orders:    Comprehensive Metabolic Panel; Future    Lipid Panel; Future    TSH with reflex to Free T4 if abnormal; Future    Paroxysmal atrial fibrillation (Multi)         Chronic anticoagulation         Vitamin D deficiency    Orders:    Vitamin D 25-Hydroxy,Total (for eval of Vitamin D levels); Future    Mild anemia    Orders:    CBC; Future    Iron and TIBC; Future    Vitamin B12; Future    Folate; Future    Acquired hypothyroidism    Orders:    TSH with reflex to Free T4 if abnormal; Future    Prediabetes    Orders:    Hemoglobin A1C; Future    Benign prostatic hyperplasia with nocturia    Orders:    Prostate Specific Antigen; Future            Portions of this encounter note have been copied from my previous note dated 8/19/24  , which have been updated where appropriate and all reflect my current medical decision making from today.     We spoke for over 30 minutes, over half the time counseling    Home situation-he lives with his wife.  He has been debilitated since his CVA years ago.  He does not drive.  They live in a split-level house-so he is living on 3 levels.  He has a walker on each level.  He has banisters up and down both sets of stairs.  He does not need any help in the bathroom-they have a shower seat.           Although his wife does all the shopping and most of the housework, Jabari can vacuum,  is aware this is a billable service  Subjective  Sherlyn Kilpatrick is a 79 y o  female    This is a chronic problem  The current episode started more than 3 days ago after drinking a glass of milk    The onset quality is sudden  The problem has been waxing and waning  The pain is located in the generalized abdominal region  Diarrhea already subsided  The quality of the pain is colicky  Pertinent negatives include no arthralgias, dysuria, fever or headaches  Exacerbated by: stress  The pain is relieved by nothing  He has tried nothing for the symptoms  Prior diagnostic workup includes upper endoscopy  There is no history of abdominal surgery, colon cancer, Crohn's disease, gallstones, GERD, irritable bowel syndrome, pancreatitis, PUD or ulcerative colitis  She has nauseasa as reminding sxs             Past Medical History:   Diagnosis Date    Anemia     Arthritis     Diabetes ([address] )     Disease of thyroid gland     GERD (gastroesophageal reflux disease)     Hypertension     Pneumonia     Tuberculosis 2019       Past Surgical History:   Procedure Laterality Date    COLONOSCOPY  09/08/2005    HYSTERECTOMY         Current Outpatient Medications   Medication Sig Dispense Refill    allopurinol (ZYLOPRIM) 100 mg tablet Take 1 tablet (100 mg total) by mouth daily 90 tablet 3    Blood Pressure Monitor KIT by Does not apply route 2 (two) times a day Check blood pressure twice daily 1 each 0    diclofenac sodium (VOLTAREN) 1 % Apply 2 g topically 4 (four) times a day 100 g 0    ergocalciferol (VITAMIN D2) 50,000 units Take 1 capsule (50,000 Units total) by mouth once a week Take one capsule weekly by mouth 4 capsule 5    glucose blood (FREESTYLE LITE) test strip Use as instructed 100 each 5    glucose monitoring kit (FREESTYLE) monitoring kit 1 each by Does not apply route 3 (three) times a day 1 each 0    Lancets (FREESTYLE) lancets Use as instructed 100 each 5    losartan (COZAAR) 25 mg tablet Take 1 tablet (25 mg total) by mouth daily 90 tablet 3    omeprazole (PriLOSEC) 20 mg delayed release capsule Take 1 capsule (20 mg total) by mouth daily 90 capsule 3    ondansetron (ZOFRAN) 4 mg tablet Take 1 tablet (4 mg total) by mouth every 8 (eight) hours as needed for nausea or vomiting 20 tablet 0    traMADol-acetaminophen (ULTRACET) 37 5-325 mg per tablet Take 1 tablet by mouth every 6 (six) hours as needed for moderate pain 30 tablet 0     No current facility-administered medications for this visit  Allergies   Allergen Reactions    Pioglitazone Rash       Review of Systems   Constitutional: Negative for diaphoresis, fatigue, fever and unexpected weight change  Respiratory: Negative for cough, chest tightness, shortness of breath and wheezing  Cardiovascular: Negative for chest pain, palpitations and leg swelling  Gastrointestinal: Positive for abdominal pain, diarrhea and nausea  Negative for blood in stool and constipation  Neurological: Negative for dizziness, syncope, light-headedness and headaches  Hematological: Does not bruise/bleed easily  Psychiatric/Behavioral: Negative for behavioral problems, self-injury and sleep disturbance  The patient is not nervous/anxious  Video Exam    There were no vitals filed for this visit  Physical Exam  Constitutional:       Appearance: She is well-developed  Pulmonary:      Effort: Pulmonary effort is normal    Neurological:      Mental Status: She is alert and oriented to person, place, and time  Psychiatric:         Behavior: Behavior normal           I spent 22 minutes directly with the patient during this visit      VIRTUAL VISIT DISCLAIMER    Dallie Shone acknowledges that she has consented to an online visit or consultation   She understands that the online visit is based solely on information provided by her, and that, in the absence of a face-to-face physical evaluation by the physician, the diagnosis she cooks and does laundry    Care planning-his wife is extraordinarily helpful-she was here today during the visit.  She will continue her remarkable efforts and contact me by phone or [employer] messaging with any additional concerns.  We spoke at length today regarding home issues including his medication, and other home concerns accordingly.    Bilateral calf and left foot erythema-we spoke at length regarding the need to continue to watch this.  He has been on a home skin care plan mainly with Aquaphor moisturizer but also intermittently with triamcinolone cream for the stasis dermatitis and irritation issues from his very tight compression hose.  His wife had used the triamcinolone cream once or twice last week but clearly there is some irritation on the right calf.  Will start with mupirocin twice daily to the areas that are red.  She will call in the next 72 hours with an update on his symptoms we will transition over the next week towards triamcinolone cream twice daily for 7 to 10 days and then return to Aquaphor.  They will continue daily feet exams and podiatry follow-up accordingly                  Gait unsteadiness/history of falls-he will use caution with his walker in his split-level house.  They have a lot of handles in the bathrooms and a shower seat along with other adaptive measures to help including banisters up both sides with the 2 sets of stairs.  They have a walker at each level    Exercise routine-is really not doing much.  Encouraged chair exercises 5 days weekly with either home video or exercise routine    S/p RSV pneumonia-improved presently.  He will look to get the RSV vaccination later this fall              RSV vaccination updated 9/4/2024    S/p hospitalization with cellulitis 2/10/2024, discharged 2/16/2024 into rehab.  He was discharged from rehab 3 days ago, on Friday, 3/8/2024 with left leg cellulitis requiring cephalexin    Recurrent stasis cellulitis-mupirocin refilled.  His left  compression hose is extraordinarily difficult.  We try to get this saw and it was a great struggle.  I strongly encouraged him to work with home supply service care to get zippered compression hose.  He is scheduled to see his dermatologist, Dr. Villalobos 3/20/2024 and his podiatrist this next week              9/24-as above we will continue topical efforts, and podiatry follow-up as well      Stasis dermatitis longstanding lymphedema/stasis cellulitis concerns -               Improved presently. He is on a stronger compression-he is able to put his socks on himself each morning. Uses triamcinolone cream and CeraVe -as recommended by the dermatologist, Dr. Villalobos who they will see as needed           Vascular surgery reevaluation 7/23 after ER visit.  Ultrasound completed          9 /24-encouraged triamcinolone cream to evenings weekly, at least an hour or so before putting on the vacuum compression device that he sleeps with       Edema -longstanding-stable, compression hose daily, also uses a pump in the evening, continue Lasix as directed - this was recently decreased from twice daily by nephrology. Patient encouraged to get a scale so that he can monitor weight at home and keep log.    His wife states that his nephrologist suggested reducing the furosemide a bit-they will take 1 tablet Monday Wednesday Friday, 2 tablets on the other days and start doing weights at home    Podiatry care with history of recurrent Left foot cellulitis /multiple amputations- he will try to keep this open to the air during the day to help keep this dry between the toes.            He will see his podiatrist, Dr. Thurston every 12 wks., no recent problems with consistent care.  He saw him 4/26/2024      Positive p-ANCA blood test-slightly elevated-additional labs ordered              Follow-up labs nondiagnostic      S/p hospitalization 1/20/2024-1/26/2024 with acute hypoxic respiratory failure associated with RSV infection-now at home this  receives is both limited and provisional in terms of accuracy and completeness  This is not intended to replace a full medical face-to-face evaluation by the physician  Genna Every understands and accepts these terms  week-clinically improved though he still has a significant cough with postnasal drip.  He did have a fiberoptic bronchoscopy the day of discharge. oxygen saturations at home typically in the 94 to 95% though a bit lower this morning.  His wife has been very attentive to his pulmonary and congestion medications.  Additionally he has been on doxycycline.  His albuterol nebulizers initially were 4 times daily now 3 times daily steroid taper and will continue.  She will continue the Mucinex maximum strength DM with ample fluids twice daily    Pulmonology care-he was scheduled to follow-up with Dr. So February 2/18/2024 in pulmonology this month-PFTs and a sleep study have been planned, following his bronchoscopy 1/26/2024 while admitted.    Postnasal drip-he will continue the Flonase twice daily and restart Zyrtec twice daily.    Home health care-             Home health care presently has been concluded    S/p overnight hospitalization 12/10/2023 with dizziness and bradycardia after a bowel movement-rather than vasovagal this was attributed to metoprolol effects.  Metoprolol has been discontinued and he has no subsequent symptoms.          Early January 2024-Home blood pressure log with systolic values typically between 1 25-1 35 systolic, with pulse in the low 60 range.  He is scheduled to see Dr. Foley in electrocardiology 2/2/2024.  His wife will continue to follow blood pressure and pulse and take that data in with their visit in 3 weeks.    S/p ER visit 7/7/2023 for right leg cellulitis concern-the ER placed him on Keflex.  He saw vascular surgery-stasis congestion suspected-venous ultrasound has been ordered-plan for tomorrow    S/p  hospitalization-with acute stay March 5 through 8, followed by rehab March 8 to 19.  At home again.  He will continue ongoing home exercises and therapy as needed fortunately improved     P. A-Fib/Arrhythmia - cardiologist, Dr. Foley has started metoprolol 25 mg one time a day.  No dizziness.  Stable clinically from a rhythm standpoint. He will continue his medications and cardiology follow-up            Stable, saw Dr. Foley  Nov '23.  Anticipate annual follow-up this fall    Anticoagulation-chronic with Eliquis-no complications with this     Pulmonary hypertension - echo April 2022 did show mild to moderate pulmonary hypertension but his cardiologist, did not feel that this was an issue. Otherwise normal EF without worrisome valvular findings on that echo     Bradycardia - discussed with wife in detail-although his heart rate gets into the high 40 range sometime he is not symptomatic. They reviewed this with Dr. Carnes who suggested reducing metoprolol succinate 25-1/2 tablet daily. Pulse rate has increased from the high 40s up into the 50 range. Recently between 55-64          1/24-off metoprolol pulse in the low 60 range     hypertension/chronic kidney disease-he will continue medications and nephrology follow-up with Dr. Sahu's successor, Dr. Contreras. He'll see her semiannually.             They will follow-up with Dr. Gutierrez semiannually             January 2024-blood pressure and pulse remain improved/stable off low-dose metoprolol.  Labs have been done for the visit with the nephrologist next week             8/24-blood pressure improved        Parkinson's - not problematic, without noticeable tremor with medications per Dr. Peters in neurology- just saw last month; cont. semiannual visits. Overall doing well from this standpoint. To see Dr. Peters semiannually. Tremor not obvious today and exam. Doing well presently. semiannual visits planned. Tolerating medications. No recent concerns stable course.   He will see Dr. Lyle annually -each October. Clinically stable    Multiple medications-patient is on multiple medications. Medication list reviewed with the patient and reconciled and updated in the EMR accordingly. Ongoing efforts to minimize the number of medications  medications as well as optimize dosing and compliance continues to be an ongoing priority.          5/24-medication list reviewed       Remote intracranial bleed/right-sided hemiparesis/gait unsteadiness - stable course. He's functionally independent for the most part at home. His wife continues to be extremely supportive accordingly. He remained stable fortunately.  His arms have normal strength, as does his left leg.  His right leg remains partially paralyzed                He will continue caution ambulating and continue exercises and stretches to maintain strength stamina as well as ambulation conditioning. He uses a walker at home typically fortunately no recent falls. Physical therapy continues on an as-needed basis     Hypothyroidism - we'll continue to follow thyroid labs semi-annually   Sept '22 TSH normal, continue present regimen.           4/24-TSH stable              9/24-TSH ordered     Prediabetes /elevated weight- monitoring on semiannual basis   Jan '22, A1c 6.1%   Sept '22, A1c 6.1%, stable from prior, discussed weight loss efforts today.        4/29/2024-A1c 5.6%, improved from 5.9%.  BMI 29.6          9/24-lipids ordered     Dyslipidemia/elevated weight -goal LDL under 100   Sept 2022 lipids slightly elevated but overall stable. Health lifestyle measures encouraged. He will continue to follow-up with cardiology (Dr. Foley) accordingly.               9/24-lipids ordered    Postnasal drip-he will continue the Flonase and restart Zyrtec     History of Recurrent pneumonia/with hospitalization again with another rehabilitation stay-    Improved presently no shortness of breath. Occas cough. Using incentive spirometer a 3 times daily     Anemia-iron deficiency concern-             Late April 2024-H&H 12.6 39.6-he will call his gastroenterologist to see if an EGD/colonoscopy is needed sooner than scheduled    History of acute renal failure-improved in hospital with hydration-creatinine 0.87 4/29/2024.   He follows with Ai Gutierrez in nephrology, in 3/24, and 5,24               Will continue to follow labs     Urinary incontinence/BPH/nephrolithiasis/hypogonadism will continue medications per urology - He follows with Dr. Srivastava in urology--    Vitamin D deficiency - encouraged patient to continue vitamin D daily as ordered. Will consider vitamin D level regularly.   Sept '22 vitamin D stable/normal.    H. Pylori - 6/24 - s/p treatment, and follow up breath test OK     Colon care/colon polyps -  He had 2 tubular adenomatous polyps on his 6/24 colonoscopy.            Next in 3 years-Jun '27        Skin care/scalp psoriasis/eczema - per Dr. Amaya annually or as needed. UTD.      Dental care / upper partial - continue every 6 months      Elevated IOP / Vision care/bilateral cataract/glasses -  visits continue. He will see Dr. Riley August 2021 to address his bilateral cataracts.           At this point the cataracts are not ready to be removed. They will continue to see her regularly              S/p right laser procedure, left laser eye  in Aug '23 for pressure concerns.            Eye visits UTD semiannually w/ Dr. Riley. Bilat cataracts                   Flu shot - each fall after Labor Day encouraged-          Updated 9/13/2024-today    COVID booster-11/23            Encouraged considering getting the new COVID booster this fall when available.      Tdap booster-will be needed this spring          5/24- ordered            8/24-encouraged    RSV vaccination-9/24    Shingrix series-completed 2018    Follow-up in 4 months, sooner as needed-             Charting was completed using voice recognition technology and may include unintended errors.

## 2024-09-16 ENCOUNTER — PATIENT OUTREACH (OUTPATIENT)
Dept: PRIMARY CARE | Facility: CLINIC | Age: 69
End: 2024-09-16
Payer: MEDICARE

## 2024-09-16 DIAGNOSIS — I10 ESSENTIAL HYPERTENSION WITH GOAL BLOOD PRESSURE LESS THAN 140/90: ICD-10-CM

## 2024-09-16 DIAGNOSIS — I48.0 PAROXYSMAL ATRIAL FIBRILLATION (MULTI): ICD-10-CM

## 2024-09-16 NOTE — PROGRESS NOTES
I called and spoke with the patients wife Berta who stated that the patient is doing well.   Patient saw Podiatry and Dr Rivera on 9/13/2024.  Appointments discusses.  Patient informed that he has fasting labs ordered October or November.  Berta will take him to have them drawn.  Instructed to call with any questions or concerns.

## 2024-10-14 ENCOUNTER — PATIENT OUTREACH (OUTPATIENT)
Dept: PRIMARY CARE | Facility: CLINIC | Age: 69
End: 2024-10-14
Payer: MEDICARE

## 2024-10-14 DIAGNOSIS — I48.0 PAROXYSMAL ATRIAL FIBRILLATION (MULTI): ICD-10-CM

## 2024-10-14 DIAGNOSIS — I10 ESSENTIAL HYPERTENSION WITH GOAL BLOOD PRESSURE LESS THAN 140/90: ICD-10-CM

## 2024-10-14 NOTE — PROGRESS NOTES
Received a call from the patient's wife who stated that Jabari is doing well.  No change in his condition since we last spoke.  No medication refills needed.  Patient will have labs drawn before next appointment.  Instructed to call with any questions or concerns.

## 2024-10-21 ENCOUNTER — OFFICE VISIT (OUTPATIENT)
Dept: WOUND CARE | Facility: CLINIC | Age: 69
End: 2024-10-21
Payer: MEDICARE

## 2024-10-21 PROCEDURE — 97597 DBRDMT OPN WND 1ST 20 CM/<: CPT

## 2024-10-21 PROCEDURE — 99213 OFFICE O/P EST LOW 20 MIN: CPT

## 2024-10-22 ENCOUNTER — APPOINTMENT (OUTPATIENT)
Dept: WOUND CARE | Facility: CLINIC | Age: 69
End: 2024-10-22
Payer: MEDICARE

## 2024-10-22 ENCOUNTER — TELEPHONE (OUTPATIENT)
Dept: PRIMARY CARE | Facility: CLINIC | Age: 69
End: 2024-10-22
Payer: MEDICARE

## 2024-10-22 DIAGNOSIS — L03.119 CELLULITIS OF LOWER EXTREMITY, UNSPECIFIED LATERALITY: Primary | ICD-10-CM

## 2024-10-22 RX ORDER — CEPHALEXIN 500 MG/1
500 CAPSULE ORAL 4 TIMES DAILY
Qty: 40 CAPSULE | Refills: 0 | Status: SHIPPED | OUTPATIENT
Start: 2024-10-22 | End: 2024-11-01

## 2024-10-22 NOTE — TELEPHONE ENCOUNTER
Spoke with patient spouse regarding cellulitis-from leg scraping-possibly from removing compression hose which are rather tight.  She plans to remove these compression hose in the future.  Gentamicin ointment was started by urgent care-he will also start Keflex.  He will see the wound clinic Monday she will call with additional concerns

## 2024-10-22 NOTE — TELEPHONE ENCOUNTER
Pt went to wound care for a wound on his leg and was given some antibiotic ointment. Pt's wife would like for pt to be seen by Dr. Rivera or if he can't if he can call in an antibiotic. She advised wound care advised he must have bumped his leg and she advised he did not and is afraid it will turn in to cellulitis. Please advise on appt or if RX can be just called in

## 2024-10-28 ENCOUNTER — OFFICE VISIT (OUTPATIENT)
Dept: PRIMARY CARE | Facility: CLINIC | Age: 69
End: 2024-10-28
Payer: MEDICARE

## 2024-10-28 ENCOUNTER — APPOINTMENT (OUTPATIENT)
Dept: WOUND CARE | Facility: CLINIC | Age: 69
End: 2024-10-28
Payer: MEDICARE

## 2024-10-28 VITALS
DIASTOLIC BLOOD PRESSURE: 67 MMHG | SYSTOLIC BLOOD PRESSURE: 143 MMHG | HEART RATE: 61 BPM | TEMPERATURE: 97.8 F | OXYGEN SATURATION: 96 %

## 2024-10-28 DIAGNOSIS — E55.9 VITAMIN D DEFICIENCY: ICD-10-CM

## 2024-10-28 PROCEDURE — 1123F ACP DISCUSS/DSCN MKR DOCD: CPT | Performed by: INTERNAL MEDICINE

## 2024-10-28 PROCEDURE — 1160F RVW MEDS BY RX/DR IN RCRD: CPT | Performed by: INTERNAL MEDICINE

## 2024-10-28 PROCEDURE — 1036F TOBACCO NON-USER: CPT | Performed by: INTERNAL MEDICINE

## 2024-10-28 PROCEDURE — 3077F SYST BP >= 140 MM HG: CPT | Performed by: INTERNAL MEDICINE

## 2024-10-28 PROCEDURE — 99214 OFFICE O/P EST MOD 30 MIN: CPT | Performed by: INTERNAL MEDICINE

## 2024-10-28 PROCEDURE — 3078F DIAST BP <80 MM HG: CPT | Performed by: INTERNAL MEDICINE

## 2024-10-28 PROCEDURE — 1159F MED LIST DOCD IN RCRD: CPT | Performed by: INTERNAL MEDICINE

## 2024-10-28 RX ORDER — ERGOCALCIFEROL 1.25 MG/1
50000 CAPSULE ORAL
Qty: 6 CAPSULE | Refills: 3 | Status: SHIPPED | OUTPATIENT
Start: 2024-10-28 | End: 2025-10-28

## 2024-11-11 ENCOUNTER — HOSPITAL ENCOUNTER (OUTPATIENT)
Dept: RADIOLOGY | Facility: CLINIC | Age: 69
Discharge: HOME | End: 2024-11-11
Payer: MEDICARE

## 2024-11-11 ENCOUNTER — LAB (OUTPATIENT)
Dept: LAB | Facility: LAB | Age: 69
End: 2024-11-11
Payer: MEDICARE

## 2024-11-11 DIAGNOSIS — I10 ESSENTIAL (PRIMARY) HYPERTENSION: Primary | ICD-10-CM

## 2024-11-11 DIAGNOSIS — I10 ESSENTIAL (PRIMARY) HYPERTENSION: ICD-10-CM

## 2024-11-11 LAB
APPEARANCE UR: CLEAR
BASOPHILS # BLD AUTO: 0.04 X10*3/UL (ref 0–0.1)
BASOPHILS NFR BLD AUTO: 0.8 %
BILIRUB UR STRIP.AUTO-MCNC: NEGATIVE MG/DL
COLOR UR: ABNORMAL
CREAT UR-MCNC: 82.8 MG/DL (ref 20–370)
EOSINOPHIL # BLD AUTO: 0.16 X10*3/UL (ref 0–0.7)
EOSINOPHIL NFR BLD AUTO: 3.1 %
ERYTHROCYTE [DISTWIDTH] IN BLOOD BY AUTOMATED COUNT: 13.4 % (ref 11.5–14.5)
GLUCOSE UR STRIP.AUTO-MCNC: NORMAL MG/DL
HCT VFR BLD AUTO: 40 % (ref 41–52)
HGB BLD-MCNC: 13.3 G/DL (ref 13.5–17.5)
IMM GRANULOCYTES # BLD AUTO: 0.01 X10*3/UL (ref 0–0.7)
IMM GRANULOCYTES NFR BLD AUTO: 0.2 % (ref 0–0.9)
KETONES UR STRIP.AUTO-MCNC: NEGATIVE MG/DL
LEUKOCYTE ESTERASE UR QL STRIP.AUTO: NEGATIVE
LYMPHOCYTES # BLD AUTO: 1.38 X10*3/UL (ref 1.2–4.8)
LYMPHOCYTES NFR BLD AUTO: 27.2 %
MCH RBC QN AUTO: 30.2 PG (ref 26–34)
MCHC RBC AUTO-ENTMCNC: 33.3 G/DL (ref 32–36)
MCV RBC AUTO: 91 FL (ref 80–100)
MONOCYTES # BLD AUTO: 0.34 X10*3/UL (ref 0.1–1)
MONOCYTES NFR BLD AUTO: 6.7 %
MUCOUS THREADS #/AREA URNS AUTO: ABNORMAL /LPF
NEUTROPHILS # BLD AUTO: 3.15 X10*3/UL (ref 1.2–7.7)
NEUTROPHILS NFR BLD AUTO: 62 %
NITRITE UR QL STRIP.AUTO: NEGATIVE
NRBC BLD-RTO: 0 /100 WBCS (ref 0–0)
PH UR STRIP.AUTO: 7 [PH]
PLATELET # BLD AUTO: 184 X10*3/UL (ref 150–450)
PROT UR STRIP.AUTO-MCNC: ABNORMAL MG/DL
PROT UR-ACNC: 21 MG/DL (ref 5–25)
PROT/CREAT UR: 0.25 MG/MG CREAT (ref 0–0.17)
RBC # BLD AUTO: 4.4 X10*6/UL (ref 4.5–5.9)
RBC # UR STRIP.AUTO: ABNORMAL /UL
RBC #/AREA URNS AUTO: >20 /HPF
SP GR UR STRIP.AUTO: 1.01
UROBILINOGEN UR STRIP.AUTO-MCNC: NORMAL MG/DL
WBC # BLD AUTO: 5.1 X10*3/UL (ref 4.4–11.3)
WBC #/AREA URNS AUTO: ABNORMAL /HPF

## 2024-11-11 PROCEDURE — 81001 URINALYSIS AUTO W/SCOPE: CPT

## 2024-11-11 PROCEDURE — 85025 COMPLETE CBC W/AUTO DIFF WBC: CPT

## 2024-11-11 PROCEDURE — 76770 US EXAM ABDO BACK WALL COMP: CPT

## 2024-11-11 PROCEDURE — 84550 ASSAY OF BLOOD/URIC ACID: CPT

## 2024-11-11 PROCEDURE — 80069 RENAL FUNCTION PANEL: CPT

## 2024-11-11 PROCEDURE — 84156 ASSAY OF PROTEIN URINE: CPT

## 2024-11-11 PROCEDURE — 82570 ASSAY OF URINE CREATININE: CPT

## 2024-11-11 PROCEDURE — 83735 ASSAY OF MAGNESIUM: CPT

## 2024-11-11 PROCEDURE — 76770 US EXAM ABDO BACK WALL COMP: CPT | Performed by: RADIOLOGY

## 2024-11-11 PROCEDURE — 36415 COLL VENOUS BLD VENIPUNCTURE: CPT

## 2024-11-12 LAB
ALBUMIN SERPL BCP-MCNC: 3.9 G/DL (ref 3.4–5)
ANION GAP SERPL CALC-SCNC: 11 MMOL/L (ref 10–20)
BUN SERPL-MCNC: 13 MG/DL (ref 6–23)
CALCIUM SERPL-MCNC: 9.1 MG/DL (ref 8.6–10.6)
CHLORIDE SERPL-SCNC: 106 MMOL/L (ref 98–107)
CO2 SERPL-SCNC: 29 MMOL/L (ref 21–32)
CREAT SERPL-MCNC: 0.84 MG/DL (ref 0.5–1.3)
EGFRCR SERPLBLD CKD-EPI 2021: >90 ML/MIN/1.73M*2
GLUCOSE SERPL-MCNC: 112 MG/DL (ref 74–99)
MAGNESIUM SERPL-MCNC: 2.02 MG/DL (ref 1.6–2.4)
PHOSPHATE SERPL-MCNC: 3.5 MG/DL (ref 2.5–4.9)
POTASSIUM SERPL-SCNC: 3.9 MMOL/L (ref 3.5–5.3)
SODIUM SERPL-SCNC: 142 MMOL/L (ref 136–145)
URATE SERPL-MCNC: 6.1 MG/DL (ref 4–7.5)

## 2024-11-14 DIAGNOSIS — R60.9 EDEMA, UNSPECIFIED TYPE: ICD-10-CM

## 2024-11-14 DIAGNOSIS — I73.9 PVD (PERIPHERAL VASCULAR DISEASE) (CMS-HCC): ICD-10-CM

## 2024-11-14 RX ORDER — FUROSEMIDE 20 MG/1
20 TABLET ORAL 2 TIMES DAILY
Qty: 180 TABLET | Refills: 3 | Status: SHIPPED | OUTPATIENT
Start: 2024-11-14

## 2024-11-19 ENCOUNTER — PATIENT OUTREACH (OUTPATIENT)
Dept: PRIMARY CARE | Facility: CLINIC | Age: 69
End: 2024-11-19
Payer: MEDICARE

## 2024-11-19 DIAGNOSIS — I10 ESSENTIAL HYPERTENSION WITH GOAL BLOOD PRESSURE LESS THAN 140/90: ICD-10-CM

## 2024-11-19 DIAGNOSIS — I48.0 PAROXYSMAL ATRIAL FIBRILLATION (MULTI): ICD-10-CM

## 2024-11-19 NOTE — PROGRESS NOTES
I spoke with the patients wife who stated that Jabari is doing well.  He has no open wounds on his legs.  He uses the Kenalog cream as needed.  If her uses it too much it makes his skin thin.  Patient is waiting for the compression stocking to come in from [facility].  No refills needed at this time.  Discussed care giver stress with the patients wife.  Instructed to call with any questions or concerns.

## 2024-12-03 ENCOUNTER — OFFICE VISIT (OUTPATIENT)
Dept: WOUND CARE | Facility: CLINIC | Age: 69
End: 2024-12-03
Payer: MEDICARE

## 2024-12-03 DIAGNOSIS — I73.9 PERIPHERAL ARTERIAL DISEASE (CMS-HCC): ICD-10-CM

## 2024-12-03 DIAGNOSIS — L97.222: ICD-10-CM

## 2024-12-03 DIAGNOSIS — I73.9 PERIPHERAL VASCULAR DISEASE, UNSPECIFIED (CMS-HCC): ICD-10-CM

## 2024-12-03 PROCEDURE — 11042 DBRDMT SUBQ TIS 1ST 20SQCM/<: CPT

## 2024-12-05 ENCOUNTER — HOSPITAL ENCOUNTER (OUTPATIENT)
Dept: CARDIOLOGY | Facility: HOSPITAL | Age: 69
Discharge: HOME | End: 2024-12-05
Payer: MEDICARE

## 2024-12-05 DIAGNOSIS — R09.89 OTHER SPECIFIED SYMPTOMS AND SIGNS INVOLVING THE CIRCULATORY AND RESPIRATORY SYSTEMS: ICD-10-CM

## 2024-12-05 DIAGNOSIS — L97.222: ICD-10-CM

## 2024-12-05 DIAGNOSIS — I73.9 PERIPHERAL VASCULAR DISEASE, UNSPECIFIED (CMS-HCC): ICD-10-CM

## 2024-12-05 DIAGNOSIS — I73.9 PERIPHERAL ARTERIAL DISEASE (CMS-HCC): ICD-10-CM

## 2024-12-05 PROCEDURE — 93922 UPR/L XTREMITY ART 2 LEVELS: CPT | Performed by: SURGERY

## 2024-12-05 PROCEDURE — 93922 UPR/L XTREMITY ART 2 LEVELS: CPT

## 2024-12-06 DIAGNOSIS — E03.9 ACQUIRED HYPOTHYROIDISM: ICD-10-CM

## 2024-12-06 RX ORDER — LEVOTHYROXINE SODIUM 25 UG/1
25 TABLET ORAL EVERY MORNING
Qty: 90 TABLET | Refills: 1 | Status: SHIPPED | OUTPATIENT
Start: 2024-12-06

## 2024-12-08 DIAGNOSIS — G20.A1 PARKINSON'S DISEASE (MULTI): ICD-10-CM

## 2024-12-09 RX ORDER — CARBIDOPA AND LEVODOPA 25; 100 MG/1; MG/1
2 TABLET ORAL 3 TIMES DAILY
Qty: 540 TABLET | Refills: 3 | Status: SHIPPED | OUTPATIENT
Start: 2024-12-09

## 2024-12-10 ENCOUNTER — PATIENT OUTREACH (OUTPATIENT)
Dept: PRIMARY CARE | Facility: CLINIC | Age: 69
End: 2024-12-10
Payer: MEDICARE

## 2024-12-10 ENCOUNTER — OFFICE VISIT (OUTPATIENT)
Dept: WOUND CARE | Facility: CLINIC | Age: 69
End: 2024-12-10
Payer: MEDICARE

## 2024-12-10 DIAGNOSIS — I48.0 PAROXYSMAL ATRIAL FIBRILLATION (MULTI): ICD-10-CM

## 2024-12-10 DIAGNOSIS — I10 ESSENTIAL HYPERTENSION WITH GOAL BLOOD PRESSURE LESS THAN 140/90: ICD-10-CM

## 2024-12-10 PROCEDURE — 99212 OFFICE O/P EST SF 10 MIN: CPT

## 2024-12-10 NOTE — PROGRESS NOTES
I spoke with the patients wife who stated that Jabari is going to the wound center today, his sore is completely healed.  The scab fell off yesterday in the shower.  Patient is covering the leg with gauze and tubigrip.  Patient had vascular US Ankle Brachial Index (BOBBY) Without Exercise completed 12/5/2024.    Patient was in the ED over the weekend with a sore throat.  Patient tested negative for covid... His spo2 on room air is 95-97%.  S&S of worsening condition discussed and when to call 911.  Wife is the patients main caregiver.  We discussed caregiver fatigue and asking for help when needed.  Wife will call with any questions or concerns.

## 2024-12-16 ENCOUNTER — OFFICE VISIT (OUTPATIENT)
Dept: PRIMARY CARE | Facility: CLINIC | Age: 69
End: 2024-12-16
Payer: MEDICARE

## 2024-12-16 ENCOUNTER — PATIENT OUTREACH (OUTPATIENT)
Dept: PRIMARY CARE | Facility: CLINIC | Age: 69
End: 2024-12-16

## 2024-12-16 VITALS
BODY MASS INDEX: 28.07 KG/M2 | HEIGHT: 72 IN | HEART RATE: 64 BPM | SYSTOLIC BLOOD PRESSURE: 132 MMHG | OXYGEN SATURATION: 97 % | DIASTOLIC BLOOD PRESSURE: 72 MMHG

## 2024-12-16 DIAGNOSIS — I10 ESSENTIAL HYPERTENSION WITH GOAL BLOOD PRESSURE LESS THAN 140/90: ICD-10-CM

## 2024-12-16 DIAGNOSIS — E61.1 IRON DEFICIENCY: ICD-10-CM

## 2024-12-16 DIAGNOSIS — I48.0 PAROXYSMAL ATRIAL FIBRILLATION (MULTI): ICD-10-CM

## 2024-12-16 DIAGNOSIS — L03.115 CELLULITIS OF LEG, RIGHT: Primary | ICD-10-CM

## 2024-12-16 PROCEDURE — 1159F MED LIST DOCD IN RCRD: CPT | Performed by: INTERNAL MEDICINE

## 2024-12-16 PROCEDURE — 3078F DIAST BP <80 MM HG: CPT | Performed by: INTERNAL MEDICINE

## 2024-12-16 PROCEDURE — 3075F SYST BP GE 130 - 139MM HG: CPT | Performed by: INTERNAL MEDICINE

## 2024-12-16 PROCEDURE — 99213 OFFICE O/P EST LOW 20 MIN: CPT | Performed by: INTERNAL MEDICINE

## 2024-12-16 PROCEDURE — 1160F RVW MEDS BY RX/DR IN RCRD: CPT | Performed by: INTERNAL MEDICINE

## 2024-12-16 PROCEDURE — 1123F ACP DISCUSS/DSCN MKR DOCD: CPT | Performed by: INTERNAL MEDICINE

## 2024-12-16 RX ORDER — LINACLOTIDE 72 UG/1
72 CAPSULE, GELATIN COATED ORAL
COMMUNITY

## 2024-12-16 RX ORDER — CEPHALEXIN 500 MG/1
500 CAPSULE ORAL 4 TIMES DAILY
Qty: 28 CAPSULE | Refills: 0 | Status: SHIPPED | OUTPATIENT
Start: 2024-12-16 | End: 2024-12-23

## 2024-12-16 RX ORDER — HYDROCHLOROTHIAZIDE 25 MG/1
25 TABLET ORAL 2 TIMES DAILY
COMMUNITY

## 2024-12-16 RX ORDER — FERROUS SULFATE 325(65) MG
325 TABLET, DELAYED RELEASE (ENTERIC COATED) ORAL
Qty: 90 TABLET | Refills: 3 | Status: SHIPPED | OUTPATIENT
Start: 2024-12-16 | End: 2025-12-16

## 2024-12-16 RX ORDER — GENTAMICIN SULFATE 1 MG/G
CREAM TOPICAL
COMMUNITY
Start: 2024-10-21

## 2024-12-16 ASSESSMENT — PATIENT HEALTH QUESTIONNAIRE - PHQ9
SUM OF ALL RESPONSES TO PHQ9 QUESTIONS 1 AND 2: 0
1. LITTLE INTEREST OR PLEASURE IN DOING THINGS: NOT AT ALL
2. FEELING DOWN, DEPRESSED OR HOPELESS: NOT AT ALL

## 2024-12-16 NOTE — PROGRESS NOTES
I received a call from the patients wife who stated that Jabari has a red patch on his leg.  The patient scratched his leg last week which fully healed.   The patient now has a red patch which is warm to the touch.   The wife is asking for an appointment to see Dr Rivera.  Appointment scheduled for 12/16/2024 at 11:30.

## 2024-12-16 NOTE — PROGRESS NOTES
Subjective   Patient ID: Jabari Swan is a 69 y.o. male who presents for Erythema (RLE).    Here with his wife concerned about worsening right lateral calf erythema which his wife noticed yesterday.  The day before he was fine.  No fevers chills using the Eucerin twice daily.  Uses Lever 2000 so         Review of Systems    Objective   /72   Pulse 64   Ht 1.829 m (6')   SpO2 97%   BMI 28.07 kg/m²     Physical Exam  Constitutional:       Comments: Well-developed male in a wheelchair at his baseline in no distress  Pulmonary exam revealed clear breath sounds bilaterally in all lung fields. No wheezes bronchitis or rales noted.  Cardiac exam revealed regular rate and rhythm without murmurs gallops or rubs.  Examination of his right calf revealed erythema laterally  Medial calf scar without acute findings  His foot with his transmetatarsal amputation was otherwise unremarkable         Assessment/Plan   Problem List Items Addressed This Visit    None  Visit Diagnoses         Codes    Cellulitis of leg, right    -  Primary L03.115    Relevant Medications    cephalexin (Keflex) 500 mg capsule    Iron deficiency     E61.1    Relevant Medications    ferrous sulfate 325 (65 Fe) MG EC tablet             Recurrent right calf cellulitis-history and exam nondiagnostic it is unclear why this continues to happen he is using the new Eucerin twice daily he follows with Dr. Gonzalez in the wound clinic he also follows with his podiatrist he is using [employer] so.  He will continue to see his podiatrist every 12 weeks he was just there last week he will use the antibiotic Keflex and follow-up with concerns otherwise as scheduled    Hypertension-presently blood pressure appears controlled with present medication. We'll continue to follow each visit, with surveillance chemistry labs regularly.    Charting was completed using voice recognition technology and may include unintended errors.

## 2024-12-23 DIAGNOSIS — I10 ESSENTIAL HYPERTENSION WITH GOAL BLOOD PRESSURE LESS THAN 140/90: ICD-10-CM

## 2024-12-23 RX ORDER — AMLODIPINE BESYLATE 10 MG/1
10 TABLET ORAL DAILY
Qty: 90 TABLET | Refills: 3 | Status: SHIPPED | OUTPATIENT
Start: 2024-12-23

## 2025-01-03 ENCOUNTER — PATIENT OUTREACH (OUTPATIENT)
Dept: PRIMARY CARE | Facility: CLINIC | Age: 70
End: 2025-01-03
Payer: MEDICARE

## 2025-01-03 ENCOUNTER — TELEPHONE (OUTPATIENT)
Dept: PRIMARY CARE | Facility: CLINIC | Age: 70
End: 2025-01-03
Payer: MEDICARE

## 2025-01-03 DIAGNOSIS — L03.115 CELLULITIS OF LEG, RIGHT: ICD-10-CM

## 2025-01-03 DIAGNOSIS — I48.0 PAROXYSMAL ATRIAL FIBRILLATION (MULTI): ICD-10-CM

## 2025-01-03 NOTE — PROGRESS NOTES
I received a call from the patients wife Berta with an update regarding Jabari's hospitalization.  Per Berta Jabari had multiple bouts of diarrhea over the past few days.  Yesterday they went to the movies and he was very tired and moved slower than usual.  Jabari has diarrhea last night and during the night.  When Jabari woke up he had soiled the bed and was clammy.  Berta took his BP and it was 104/52.  Berta called 911 and they took Jabari to [facility].   Berta feels the antibiotics from his cellulitis may be the cause of his diarrhea along with the Linzess.  Berta will keep me up to date on Jabari's condition.

## 2025-01-06 DIAGNOSIS — Z00.00 ENCOUNTER FOR GENERAL ADULT MEDICAL EXAMINATION WITHOUT ABNORMAL FINDINGS: ICD-10-CM

## 2025-01-06 DIAGNOSIS — E61.1 IRON DEFICIENCY: ICD-10-CM

## 2025-01-06 DIAGNOSIS — G81.91 HEMIPARESIS, RIGHT (MULTI): ICD-10-CM

## 2025-01-07 RX ORDER — FERROUS SULFATE 325(65) MG
325 TABLET, DELAYED RELEASE (ENTERIC COATED) ORAL
Qty: 90 TABLET | Refills: 3 | Status: SHIPPED | OUTPATIENT
Start: 2025-01-07 | End: 2026-01-07

## 2025-01-07 RX ORDER — FOLIC ACID 1 MG/1
1 TABLET ORAL DAILY
Qty: 90 TABLET | Refills: 3 | Status: SHIPPED | OUTPATIENT
Start: 2025-01-07 | End: 2026-01-07

## 2025-01-10 ENCOUNTER — APPOINTMENT (OUTPATIENT)
Dept: PRIMARY CARE | Facility: CLINIC | Age: 70
End: 2025-01-10
Payer: MEDICARE

## 2025-01-10 VITALS
DIASTOLIC BLOOD PRESSURE: 76 MMHG | OXYGEN SATURATION: 100 % | BODY MASS INDEX: 28.07 KG/M2 | HEART RATE: 49 BPM | HEIGHT: 72 IN | SYSTOLIC BLOOD PRESSURE: 146 MMHG

## 2025-01-10 DIAGNOSIS — I87.2 VENOUS STASIS DERMATITIS OF BOTH LOWER EXTREMITIES: ICD-10-CM

## 2025-01-10 DIAGNOSIS — E03.9 ACQUIRED HYPOTHYROIDISM: ICD-10-CM

## 2025-01-10 DIAGNOSIS — J12.1 RSV (RESPIRATORY SYNCYTIAL VIRUS PNEUMONIA): ICD-10-CM

## 2025-01-10 DIAGNOSIS — I10 ESSENTIAL HYPERTENSION WITH GOAL BLOOD PRESSURE LESS THAN 140/90: ICD-10-CM

## 2025-01-10 DIAGNOSIS — Z78.9 HEALTH CARE HOME, ACTIVE CARE COORDINATION: ICD-10-CM

## 2025-01-10 DIAGNOSIS — E78.5 DYSLIPIDEMIA: ICD-10-CM

## 2025-01-10 DIAGNOSIS — I73.9 PVD (PERIPHERAL VASCULAR DISEASE) (CMS-HCC): Primary | ICD-10-CM

## 2025-01-10 DIAGNOSIS — N18.31 STAGE 3A CHRONIC KIDNEY DISEASE (MULTI): ICD-10-CM

## 2025-01-10 DIAGNOSIS — Z89.431 STATUS POST AMPUTATION OF RIGHT FOOT THROUGH METATARSAL BONE (MULTI): ICD-10-CM

## 2025-01-10 PROCEDURE — 1036F TOBACCO NON-USER: CPT | Performed by: INTERNAL MEDICINE

## 2025-01-10 PROCEDURE — G2211 COMPLEX E/M VISIT ADD ON: HCPCS | Performed by: INTERNAL MEDICINE

## 2025-01-10 PROCEDURE — 1160F RVW MEDS BY RX/DR IN RCRD: CPT | Performed by: INTERNAL MEDICINE

## 2025-01-10 PROCEDURE — 1159F MED LIST DOCD IN RCRD: CPT | Performed by: INTERNAL MEDICINE

## 2025-01-10 PROCEDURE — 3078F DIAST BP <80 MM HG: CPT | Performed by: INTERNAL MEDICINE

## 2025-01-10 PROCEDURE — 1123F ACP DISCUSS/DSCN MKR DOCD: CPT | Performed by: INTERNAL MEDICINE

## 2025-01-10 PROCEDURE — 99214 OFFICE O/P EST MOD 30 MIN: CPT | Performed by: INTERNAL MEDICINE

## 2025-01-10 PROCEDURE — 3077F SYST BP >= 140 MM HG: CPT | Performed by: INTERNAL MEDICINE

## 2025-01-10 RX ORDER — TRIAMCINOLONE ACETONIDE 1 MG/G
CREAM TOPICAL NIGHTLY
Qty: 454 G | Refills: 2 | Status: SHIPPED | OUTPATIENT
Start: 2025-01-10

## 2025-01-10 NOTE — PROGRESS NOTES
Subjective   Patient ID: Jabari Swan is a 69 y.o. male who presents for Hospital Follow-up.    Here for follow-up with his wife.  She called the squad and he went to the ER on January 3 after she found him cold and clammy and sweaty in bed.  His blood pressure was on the low side at 104/72 with a pulse of 77.  He had no fevers and his pulse ox were okay but she was concerned and got him to the ER.  He was admitted and was found to be dehydrated with the diarrhea.  Improved with discharge    He was in the hospital December 30 and discharged on New Year's Lina with diarrhea.  He had been on an antibiotic for his cellulitis which was felt to cause the diarrhea.         Review of Systems    Objective   /76 (BP Location: Left arm, Patient Position: Sitting, BP Cuff Size: Large adult)   Pulse (!) 49   Ht 1.829 m (6')   SpO2 100%   BMI 28.07 kg/m²     Physical Exam  Constitutional:       Comments: Well-appearing male in a wheelchair appears stable in no distress comfortable at times smiling  Eye exam revealed pupils equally round and reactive, with extraocular muscles intact. Normal sclera and eyelids.  Neck exam revealed no masses, adenopathy or thyromegaly. No neck pain on range of motion was noted.  Pulmonary exam revealed clear breath sounds bilaterally in all lung fields. No wheezes bronchitis or rales noted.  Cardiac exam revealed regular rate and rhythm without murmurs gallops or rubs.  No back flank or abdominal discomfort  Right calf, without compression hose on with anterior medial darker chronic pigmentation changes, laterally more tanned-without active cellulitis  Mental status at baseline  Gait not tested   Skin:            Comments: Right lateral calf more tan-colored-without active cellulitis    Medial right calf-darker chronic scarring without active changes         Assessment/Plan   Problem List Items Addressed This Visit             ICD-10-CM    Chronic kidney disease N18.9    Dyslipidemia  E78.5    Essential hypertension with goal blood pressure less than 140/90 I10    Hypothyroidism E03.9    PVD (peripheral vascular disease) (CMS-[facility]) - Primary I73.9    RESOLVED: RSV (respiratory syncytial virus pneumonia) J12.1    Health care home, active care coordination Z78.9    Venous stasis dermatitis of both lower extremities I87.2    Relevant Medications    triamcinolone (Kenalog) 0.1 % cream    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     Labs reviewed-from December 30, December 31 and January 3  Radiology reports reviewed from recent ER visits-right venous duplex, chest x-ray and CT abdomen from January 3 and December 30 respectively  ER visit notes reviewed      Home situation-he lives with his wife.  He has been debilitated since his CVA years ago.  He does not drive.  They live in a split-level house-so he is living on 3 levels.  He has a walker on each level.  He has banisters up and down both sets of stairs.  He does not need any help in the bathroom-they have a shower seat.           Although his wife does all the shopping and most of the housework, Jabari can vacuum, cooks and does laundry    Care planning-his wife is extraordinarily helpful-she was here today during the visit.  She will continue her remarkable efforts and contact me by phone or MyChart messaging with any additional concerns.             We spoke at length today regarding home issues including his medication, and other home concerns accordingly.    Patient was recently discharged from an inpatient facility. Discharge medication list reviewed and reconciled with her electronic medical record medication list. Discharge orders reviewed, and we'll continue to follow active medical problems as  outlined in this note.    S/p ER visit 1/3/2025-with diarrhea and dehydration-improved with IV fluid.  Linzess was provided but discontinued.    S/p hospitalization 12/30/2024-12/31/2024-with diarrhea and dehydration-from the recent antibiotic for cellulitis-improved presently    Bilateral calf and left foot erythema-we spoke at length regarding the need to continue to watch this.  He has been on a home skin care plan mainly with Aquaphor moisturizer but also intermittently with triamcinolone cream for the stasis dermatitis and irritation issues from his very tight compression hose.  His wife had used the triamcinolone cream once or twice last week but clearly there is some irritation on the right calf.  Will start with mupirocin twice daily to the areas that are red.  She will call in the next 72 hours with an update on his symptoms we will transition over the next week towards triamcinolone cream twice daily for 7 to 10 days and then return to Aquaphor.  They will continue daily feet exams and podiatry follow-up accordingly            10/24-as above status post ER visit over the weekend with IV antibiotics provided.  He will finish the cephalexin           1/10/2025-at this point the main focus was his right calf-he has a medial port that has chronic stasis pigmentation changes and a anterior lateral part that appears chronically tan-with stasis pigmentation changes but not as dark.  We discussed her new routine-  Before dinner he will remove his compression hose  After dinner he will do the 60-minute pneumatic compression cycle and then remove the plastic sleeves  On the right calf they will use triamcinolone nightly  On the left calf they will use the Eucerin  On each calf, each morning they will use the Eucerin  He will continue the compression hose during the day      Recent diarrhea-improved-onset with the antibiotic.  Will work to reduce his antibiotic frequency                    Gait unsteadiness/history  of falls-he will use caution with his walker in his split-level house.  They have a lot of handles in the bathrooms and a shower seat along with other adaptive measures to help including banisters up both sides with the 2 sets of stairs.  They have a walker at each level    Exercise routine-is really not doing much.  Encouraged chair exercises 5 days weekly with either home video or exercise routine    S/p RSV pneumonia-improved presently.  He will look to get the RSV vaccination later this fall              RSV vaccination updated 9/4/2024    S/p hospitalization with cellulitis 2/10/2024, discharged 2/16/2024 into rehab.  He was discharged from rehab 3 days ago, on Friday, 3/8/2024 with left leg cellulitis requiring cephalexin    Recurrent stasis cellulitis-mupirocin refilled.  His left compression hose is extraordinarily difficult.  We try to get this saw and it was a great struggle.  I strongly encouraged him to work with home supply service care to get zippered compression hose.  He is scheduled to see his dermatologist, Dr. Villalobos 3/20/2024 and his podiatrist this next week              9/24-as above we will continue topical efforts, and podiatry follow-up as well               10/24-he has been to the wound clinic a week ago which provided topical gentamicin then in the ER with worsening right posterior calf redness associated with several linear excoriations-he received IV antibiotics and discharged earlier today from the ER.  Improved clinically.  Clearly his tight compression hose remain an issue-it appears that he needs compression hose that are not as compressive.  His wife will clarify the present strength and will reduce that through his supply store.  He will follow-up with the wound clinic as needed      Stasis dermatitis longstanding lymphedema/stasis cellulitis concerns -               Improved presently. He is on a stronger compression-he is able to put his socks on himself each morning. Uses  triamcinolone cream and CeraVe -as recommended by the dermatologist, Dr. Villalobos who they will see as needed           Vascular surgery reevaluation 7/23 after ER visit.  Ultrasound completed          9 /24-encouraged triamcinolone cream to evenings weekly, at least an hour or so before putting on the vacuum compression device that he sleeps with             10/24-he will continue the present plan as above and use compression hose that are not as difficult to get off so that he does not scrape the skin getting these on or off       Longitudinal excoriations-from working to get his compression hose on and off-will change to a less compressive strength compression hose prescription.  Strongly encouraged him to use more caution pulling on and taking off his compression hose the follow-up with concerns    Edema -longstanding-stable, compression hose daily, also uses a pump in the evening, continue Lasix as directed - this was recently decreased from twice daily by nephrology. Patient encouraged to get a scale so that he can monitor weight at home and keep log.    His wife states that his nephrologist suggested reducing the furosemide a bit-they will take 1 tablet Monday Wednesday Friday, 2 tablets on the other days and start doing weights at home              10/24-as above will reduce the compression on his stockings and will follow this accordingly    Podiatry care with history of recurrent Left foot cellulitis /multiple amputations- he will try to keep this open to the air during the day to help keep this dry between the toes.            He will see his podiatrist, Dr. Thurston every 12 wks., no recent problems with consistent care.  He saw him 4/26/2024      Positive p-ANCA blood test-slightly elevated-additional labs ordered              Follow-up labs nondiagnostic      S/p hospitalization 1/20/2024-1/26/2024 with acute hypoxic respiratory failure associated with RSV infection-now at home this week-clinically improved  though he still has a significant cough with postnasal drip.  He did have a fiberoptic bronchoscopy the day of discharge. oxygen saturations at home typically in the 94 to 95% though a bit lower this morning.  His wife has been very attentive to his pulmonary and congestion medications.  Additionally he has been on doxycycline.  His albuterol nebulizers initially were 4 times daily now 3 times daily steroid taper and will continue.  She will continue the Mucinex maximum strength DM with ample fluids twice daily    Pulmonology care-he was scheduled to follow-up with Dr. So February 2/18/2024 in pulmonology this month-PFTs and a sleep study have been planned, following his bronchoscopy 1/26/2024 while admitted.    Postnasal drip-he will continue the Flonase twice daily and restart Zyrtec twice daily.    Home health care-             Home health care presently has been concluded    S/p overnight hospitalization 12/10/2023 with dizziness and bradycardia after a bowel movement-rather than vasovagal this was attributed to metoprolol effects.  Metoprolol has been discontinued and he has no subsequent symptoms.          Early January 2024-Home blood pressure log with systolic values typically between 1 25-1 35 systolic, with pulse in the low 60 range.  He is scheduled to see Dr. Foley in electrocardiology 2/2/2024.  His wife will continue to follow blood pressure and pulse and take that data in with their visit in 3 weeks.    S/p ER visit 7/7/2023 for right leg cellulitis concern-the ER placed him on Keflex.  He saw vascular surgery-stasis congestion suspected-venous ultrasound has been ordered-plan for tomorrow    S/p  hospitalization-with acute stay March 5 through 8, followed by rehab March 8 to 19.  At home again.  He will continue ongoing home exercises and therapy as needed fortunately improved     P. A-Fib/Arrhythmia - cardiologist, Dr. Foley has started metoprolol 25 mg one time a day. No dizziness.  Stable  clinically from a rhythm standpoint. He will continue his medications and cardiology follow-up            Stable, saw Dr. Foley  Nov '23.  Anticipate annual follow-up this fall    Anticoagulation-chronic with Eliquis-no complications with this     Pulmonary hypertension - echo April 2022 did show mild to moderate pulmonary hypertension but his cardiologist, did not feel that this was an issue. Otherwise normal EF without worrisome valvular findings on that echo     Bradycardia - discussed with wife in detail-although his heart rate gets into the high 40 range sometime he is not symptomatic. They reviewed this with Dr. Carnes who suggested reducing metoprolol succinate 25-1/2 tablet daily. Pulse rate has increased from the high 40s up into the 50 range. Recently between 55-64          1/24-off metoprolol pulse in the low 60 range     hypertension/chronic kidney disease-he will continue medications and nephrology follow-up with Dr. Sahu's successor, Dr. Contreras. He'll see her semiannually.             They will follow-up with Dr. Gutierrez semiannually             January 2024-blood pressure and pulse remain improved/stable off low-dose metoprolol.  Labs have been done for the visit with the nephrologist next week             8/24-blood pressure improved              10/24-blood pressure slightly elevated.  Normal creatinine 0.76        Parkinson's - not problematic, without noticeable tremor with medications per Dr. Peters in neurology- just saw last month; cont. semiannual visits. Overall doing well from this standpoint. To see Dr. Peters semiannually. Tremor not obvious today and exam. Doing well presently. semiannual visits planned. Tolerating medications. No recent concerns stable course.   He will see Dr. Lyle annually -each October. Clinically stable    Multiple medications-patient is on multiple medications. Medication list reviewed with the patient and reconciled and updated in the EMR accordingly. Ongoing  efforts to minimize the number of medications medications as well as optimize dosing and compliance continues to be an ongoing priority.          5/24-medication list reviewed       Remote intracranial bleed/right-sided hemiparesis/gait unsteadiness - stable course. He's functionally independent for the most part at home. His wife continues to be extremely supportive accordingly. He remained stable fortunately.  His arms have normal strength, as does his left leg.  His right leg remains partially paralyzed                He will continue caution ambulating and continue exercises and stretches to maintain strength stamina as well as ambulation conditioning. He uses a walker at home typically fortunately no recent falls. Physical therapy continues on an as-needed basis     Hypothyroidism - we'll continue to follow thyroid labs semi-annually   Sept '22 TSH normal, continue present regimen.           4/24-TSH stable              9/24-TSH ordered     Prediabetes /elevated weight- monitoring on semiannual basis   Jan '22, A1c 6.1%   Sept '22, A1c 6.1%, stable from prior, discussed weight loss efforts today.        4/29/2024-A1c 5.6%, improved from 5.9%.  BMI 29.6          9/24-lipids ordered     Dyslipidemia/elevated weight -goal LDL under 100   Sept 2022 lipids slightly elevated but overall stable. Health lifestyle measures encouraged. He will continue to follow-up with cardiology (Dr. Foley) accordingly.               9/24-lipids ordered    Postnasal drip-he will continue the Flonase and restart Zyrtec     History of Recurrent pneumonia/with hospitalization again with another rehabilitation stay-    Improved presently no shortness of breath. Occas cough. Using incentive spirometer a 3 times daily     Anemia-iron deficiency concern-             Late April 2024-H&H 12.6 39.6-he will call his gastroenterologist to see if an EGD/colonoscopy is needed sooner than scheduled            H&H normal earlier today  10/25/2024    History of acute renal failure-improved in hospital with hydration-creatinine 0.87 4/29/2024.  He follows with Ai Gutierrez in nephrology, in 3/24, and 5,24               Will continue to follow labs     Urinary incontinence/BPH/nephrolithiasis/hypogonadism will continue medications per urology - He follows with Dr. Srivastava in urology--              No recent concerns    Vitamin D deficiency - encouraged patient to continue vitamin D daily as ordered. Will consider vitamin D level regularly.   Sept '22 vitamin D stable/normal.    H. Pylori - 6/24 - s/p treatment, and follow up breath test OK     Colon care/colon polyps -  He had 2 tubular adenomatous polyps on his 6/24 colonoscopy.            Next in 3 years-Jun '27        Skin care/scalp psoriasis/eczema - per Dr. Amaya annually or as needed. UTD.      Dental care / upper partial - continue every 6 months      Elevated IOP / Vision care/bilateral cataract/glasses -  visits continue. He will see Dr. Riley August 2021 to address his bilateral cataracts.           At this point the cataracts are not ready to be removed. They will continue to see her regularly              S/p right laser procedure, left laser eye  in Aug '23 for pressure concerns.            Eye visits UTD semiannually w/ Dr. Riley. Bilat cataracts                   Flu shot - each fall after Labor Day encouraged-          Updated 9/13/2024    COVID booster-11/23            Encouraged considering getting the new COVID booster this fall when available.      Tdap booster-will be needed this spring          5/24- ordered            8/24-encouraged    RSV vaccination-9/24    Shingrix series-completed 2018    Follow-up in 4 months, sooner as needed-             Charting was completed using voice recognition technology and may include unintended errors.

## 2025-01-12 PROBLEM — J18.9 PNEUMONIA: Status: RESOLVED | Noted: 2017-12-27 | Resolved: 2025-01-12

## 2025-01-16 ENCOUNTER — APPOINTMENT (OUTPATIENT)
Dept: PRIMARY CARE | Facility: CLINIC | Age: 70
End: 2025-01-16
Payer: MEDICARE

## 2025-01-17 ENCOUNTER — OFFICE VISIT (OUTPATIENT)
Dept: PRIMARY CARE | Facility: CLINIC | Age: 70
End: 2025-01-17
Payer: MEDICARE

## 2025-01-17 VITALS
OXYGEN SATURATION: 98 % | HEIGHT: 72 IN | HEART RATE: 72 BPM | SYSTOLIC BLOOD PRESSURE: 148 MMHG | DIASTOLIC BLOOD PRESSURE: 86 MMHG | BODY MASS INDEX: 28.07 KG/M2

## 2025-01-17 DIAGNOSIS — L85.3 DRY SKIN DERMATITIS: Primary | ICD-10-CM

## 2025-01-17 DIAGNOSIS — I87.2 VENOUS STASIS DERMATITIS OF BOTH LOWER EXTREMITIES: ICD-10-CM

## 2025-01-17 PROCEDURE — 99213 OFFICE O/P EST LOW 20 MIN: CPT | Performed by: INTERNAL MEDICINE

## 2025-01-17 PROCEDURE — 3079F DIAST BP 80-89 MM HG: CPT | Performed by: INTERNAL MEDICINE

## 2025-01-17 PROCEDURE — 1036F TOBACCO NON-USER: CPT | Performed by: INTERNAL MEDICINE

## 2025-01-17 PROCEDURE — 3077F SYST BP >= 140 MM HG: CPT | Performed by: INTERNAL MEDICINE

## 2025-01-17 PROCEDURE — 1159F MED LIST DOCD IN RCRD: CPT | Performed by: INTERNAL MEDICINE

## 2025-01-17 PROCEDURE — 1123F ACP DISCUSS/DSCN MKR DOCD: CPT | Performed by: INTERNAL MEDICINE

## 2025-01-17 PROCEDURE — 1160F RVW MEDS BY RX/DR IN RCRD: CPT | Performed by: INTERNAL MEDICINE

## 2025-01-17 RX ORDER — MUPIROCIN 20 MG/G
OINTMENT TOPICAL
Qty: 44 G | Refills: 2 | Status: SHIPPED | OUTPATIENT
Start: 2025-01-17

## 2025-01-17 NOTE — PROGRESS NOTES
Subjective   Patient ID: Jabari Swan is a 69 y.o. male who presents for Follow-up.    Here today concerned about right lateral calf cellulitis.  His wife woke up and states that the right calf seemed more red to her.  No recent trauma no scratching they have been using the moisturizer and triamcinolone cream         Review of Systems    Objective   /86 (BP Location: Right arm, Patient Position: Sitting, BP Cuff Size: Large adult)   Pulse 72   Ht 1.829 m (6')   SpO2 98%   BMI 28.07 kg/m²     Physical Exam  Constitutional:       Comments: Well-appearing male at baseline in his wheelchair in good spirits accompanied by his wife    Pulmonary exam revealed clear breath sounds bilaterally in all lung fields. No wheezes bronchitis or rales noted.  Cardiac exam revealed regular rate and rhythm without murmurs gallops or rubs.  Right foot with transmetatarsal amputation at the midfoot.    Right lateral anterior lateral calf-slightly erythematous patch, the surrounding dry skin appeared better    The medial tan patch encompassing most of his medial right calf had a surrounding erythematous area-extending out about a centimeter at the baseline         Assessment/Plan   Problem List Items Addressed This Visit             ICD-10-CM    Venous stasis dermatitis of both lower extremities I87.2    Relevant Medications    mupirocin (Bactroban) 2 % ointment            Recurrent stasis cellulitis/dry skin dermatosis-at this point I think topical mupirocin 3 times daily instead of the compression hose of the right calf is the best step over the next week.  His wife will continue to plan on the left leg.  After dinner they will use the pneumatic compression device for the 1 hour cycle they typically do after dinner.  She will send a [employer] message in 5 days with an update on his symptoms    They will call with concerns otherwise follow-up as scheduled

## 2025-01-23 ENCOUNTER — OFFICE VISIT (OUTPATIENT)
Dept: WOUND CARE | Facility: CLINIC | Age: 70
End: 2025-01-23
Payer: MEDICARE

## 2025-01-23 PROCEDURE — 99213 OFFICE O/P EST LOW 20 MIN: CPT

## 2025-01-30 ENCOUNTER — OFFICE VISIT (OUTPATIENT)
Dept: WOUND CARE | Facility: CLINIC | Age: 70
End: 2025-01-30
Payer: MEDICARE

## 2025-01-30 PROCEDURE — 99213 OFFICE O/P EST LOW 20 MIN: CPT

## 2025-01-31 DIAGNOSIS — E03.9 ACQUIRED HYPOTHYROIDISM: ICD-10-CM

## 2025-01-31 RX ORDER — LEVOTHYROXINE SODIUM 25 UG/1
25 TABLET ORAL EVERY MORNING
Qty: 30 TABLET | Refills: 0 | Status: SHIPPED | OUTPATIENT
Start: 2025-01-31

## 2025-01-31 RX ORDER — LEVOTHYROXINE SODIUM 25 UG/1
25 TABLET ORAL EVERY MORNING
Qty: 90 TABLET | Refills: 1 | Status: SHIPPED | OUTPATIENT
Start: 2025-01-31 | End: 2025-01-31 | Stop reason: SDUPTHER

## 2025-01-31 NOTE — PROGRESS NOTES
Sent 30 days of levothyroxine to local as wife upset that not sent there as will not get for 2 weeks. Reassured would be ok to wait but will send

## 2025-02-03 ENCOUNTER — PATIENT OUTREACH (OUTPATIENT)
Dept: PRIMARY CARE | Facility: CLINIC | Age: 70
End: 2025-02-03
Payer: MEDICARE

## 2025-02-03 DIAGNOSIS — I10 ESSENTIAL HYPERTENSION WITH GOAL BLOOD PRESSURE LESS THAN 140/90: ICD-10-CM

## 2025-02-03 DIAGNOSIS — I87.2 VENOUS STASIS DERMATITIS OF BOTH LOWER EXTREMITIES: ICD-10-CM

## 2025-02-03 NOTE — PROGRESS NOTES
I received a call from the patients wife, Berta.  Per Berta Barboza's legs are completely healed.  He no longer need to go to the [facility] center.  Patient has his refill of Synthroid.  Patient will call with any questions or concerns.

## 2025-02-21 ENCOUNTER — PATIENT OUTREACH (OUTPATIENT)
Dept: PRIMARY CARE | Facility: CLINIC | Age: 70
End: 2025-02-21
Payer: MEDICARE

## 2025-02-21 DIAGNOSIS — I87.2 VENOUS STASIS DERMATITIS OF BOTH LOWER EXTREMITIES: ICD-10-CM

## 2025-02-21 DIAGNOSIS — I10 ESSENTIAL HYPERTENSION WITH GOAL BLOOD PRESSURE LESS THAN 140/90: ICD-10-CM

## 2025-02-21 RX ORDER — AMLODIPINE BESYLATE 10 MG/1
10 TABLET ORAL DAILY
Qty: 90 TABLET | Refills: 3 | Status: SHIPPED | OUTPATIENT
Start: 2025-02-21

## 2025-02-21 RX ORDER — PREDNISONE 20 MG/1
60 TABLET ORAL
COMMUNITY
Start: 2025-02-21 | End: 2025-02-24

## 2025-02-21 RX ORDER — OXYCODONE AND ACETAMINOPHEN 5; 325 MG/1; MG/1
1 TABLET ORAL EVERY 6 HOURS PRN
COMMUNITY
Start: 2025-02-21 | End: 2025-02-24

## 2025-02-21 RX ORDER — CYCLOBENZAPRINE HCL 10 MG
10 TABLET ORAL EVERY 8 HOURS PRN
COMMUNITY
Start: 2025-02-21

## 2025-02-21 NOTE — PROGRESS NOTES
I received a call from the patient's wife who stated that Jabari was in the ED for 14 hours.  Jabari was diagnosed with lower back arthritis, per Berta.  Jabari went to the ED because he could hardly walk and was having back spasms.  He also had severe pain in his right hip.  Jabari was given a RX for Percocet, Prednisone, and Flexeril which Berta will  today.  Instructed to call with any questions or concerns.

## 2025-03-09 DIAGNOSIS — E61.1 IRON DEFICIENCY: ICD-10-CM

## 2025-03-10 ENCOUNTER — PATIENT OUTREACH (OUTPATIENT)
Dept: PRIMARY CARE | Facility: CLINIC | Age: 70
End: 2025-03-10
Payer: MEDICARE

## 2025-03-10 DIAGNOSIS — I73.9 PVD (PERIPHERAL VASCULAR DISEASE) (CMS-HCC): ICD-10-CM

## 2025-03-10 DIAGNOSIS — I10 ESSENTIAL HYPERTENSION WITH GOAL BLOOD PRESSURE LESS THAN 140/90: ICD-10-CM

## 2025-03-10 RX ORDER — FERROUS SULFATE 325(65) MG
TABLET ORAL
Qty: 90 TABLET | Refills: 3 | Status: SHIPPED | OUTPATIENT
Start: 2025-03-10

## 2025-03-10 NOTE — PROGRESS NOTES
I received a call from the patients wife who stated that Jabari was still in the hospital.  He will likely be discharged today or tomorrow.  Lab and test questions discussed.  Jabari is being treated for an UTI.  Jabari will follow up with Dr Rivera for a hospital discharge appointment.  Instructed to call with any questions or concerns.

## 2025-03-11 ENCOUNTER — PATIENT OUTREACH (OUTPATIENT)
Dept: PRIMARY CARE | Facility: CLINIC | Age: 70
End: 2025-03-11
Payer: MEDICARE

## 2025-03-11 DIAGNOSIS — I10 ESSENTIAL HYPERTENSION WITH GOAL BLOOD PRESSURE LESS THAN 140/90: ICD-10-CM

## 2025-03-11 DIAGNOSIS — I73.9 PVD (PERIPHERAL VASCULAR DISEASE) (CMS-HCC): ICD-10-CM

## 2025-03-11 NOTE — PROGRESS NOTES
Discharge facility: [facility]  Discharge diagnosis: UTI  Admission date:3/9/2025  Discharge date: 3/10/2025    PCP Appointment Date: 3/21/2025.  Specialist Appointment Date: JOAQUINA  Hospital Encounter and Summary: Linked   Patient Outreach with Roseanne Kong RN (03/11/2025)   See Discharge assessment below for further details      Medications  Medications reviewed with patient/caregiver?: Yes (3/11/2025  1:45 PM)  Is the patient having any side effects they believe may be caused by any medication additions or changes?: No (3/11/2025  1:45 PM)  Does the patient have all medications ordered at discharge?: Yes (3/11/2025  1:45 PM)  Care Management Interventions: Provided patient education (3/11/2025  1:45 PM)  Prescription Comments: Keflex for UTI (3/11/2025  1:45 PM)  Is the patient taking all medications as directed (includes completed medication regime)?: Yes (3/11/2025  1:45 PM)  Care Management Interventions: Provided patient education (3/11/2025  1:45 PM)    Appointments  Does the patient have a primary care provider?: Yes (3/11/2025  1:45 PM)  Has the patient kept scheduled appointments due by today?: Yes (3/11/2025  1:45 PM)  Follow Up Tasks: Appointments (3/11/2025  1:45 PM)    Self Management  What is the home health agency?: NA (3/11/2025  1:45 PM)     TCM outreach completed. 3/11/2025  A in-basket message was sent to PRACTICE STAFF to assist with scheduling patient for 14 day follow-up.    Hospital Discharge Date: 3/10/2025  In order to bill as a TCM, the patient needs to be seen by: 3/23/2025

## 2025-03-21 ENCOUNTER — APPOINTMENT (OUTPATIENT)
Dept: PRIMARY CARE | Facility: CLINIC | Age: 70
End: 2025-03-21
Payer: MEDICARE

## 2025-03-21 VITALS
OXYGEN SATURATION: 97 % | SYSTOLIC BLOOD PRESSURE: 146 MMHG | HEIGHT: 72 IN | BODY MASS INDEX: 26.36 KG/M2 | HEART RATE: 81 BPM | WEIGHT: 194.6 LBS | DIASTOLIC BLOOD PRESSURE: 84 MMHG

## 2025-03-21 DIAGNOSIS — R73.03 PREDIABETES: ICD-10-CM

## 2025-03-21 DIAGNOSIS — R74.8 ELEVATED LIPASE: ICD-10-CM

## 2025-03-21 DIAGNOSIS — I87.323 CHRONIC VENOUS HYPERTENSION WITH INFLAMMATION INVOLVING BOTH SIDES: ICD-10-CM

## 2025-03-21 DIAGNOSIS — I10 ESSENTIAL HYPERTENSION WITH GOAL BLOOD PRESSURE LESS THAN 140/90: ICD-10-CM

## 2025-03-21 DIAGNOSIS — I69.351 HEMIPLEGIA AND HEMIPARESIS FOLLOWING CEREBRAL INFARCTION AFFECTING RIGHT DOMINANT SIDE (MULTI): ICD-10-CM

## 2025-03-21 DIAGNOSIS — R39.15 URGENCY OF URINATION: Primary | ICD-10-CM

## 2025-03-21 DIAGNOSIS — I87.2 VENOUS STASIS DERMATITIS OF BOTH LOWER EXTREMITIES: ICD-10-CM

## 2025-03-21 DIAGNOSIS — I73.9 PVD (PERIPHERAL VASCULAR DISEASE) (CMS-HCC): ICD-10-CM

## 2025-03-21 DIAGNOSIS — E55.9 VITAMIN D DEFICIENCY: ICD-10-CM

## 2025-03-21 DIAGNOSIS — E03.9 ACQUIRED HYPOTHYROIDISM: ICD-10-CM

## 2025-03-21 DIAGNOSIS — I48.0 PAROXYSMAL ATRIAL FIBRILLATION (MULTI): ICD-10-CM

## 2025-03-21 DIAGNOSIS — R39.15 URINARY URGENCY: ICD-10-CM

## 2025-03-21 DIAGNOSIS — I25.10 ATHEROSCLEROSIS OF NATIVE CORONARY ARTERY OF NATIVE HEART WITHOUT ANGINA PECTORIS: ICD-10-CM

## 2025-03-21 PROCEDURE — 1159F MED LIST DOCD IN RCRD: CPT | Performed by: INTERNAL MEDICINE

## 2025-03-21 PROCEDURE — 99495 TRANSJ CARE MGMT MOD F2F 14D: CPT | Performed by: INTERNAL MEDICINE

## 2025-03-21 PROCEDURE — 3008F BODY MASS INDEX DOCD: CPT | Performed by: INTERNAL MEDICINE

## 2025-03-21 PROCEDURE — 3077F SYST BP >= 140 MM HG: CPT | Performed by: INTERNAL MEDICINE

## 2025-03-21 PROCEDURE — 3079F DIAST BP 80-89 MM HG: CPT | Performed by: INTERNAL MEDICINE

## 2025-03-21 PROCEDURE — 1160F RVW MEDS BY RX/DR IN RCRD: CPT | Performed by: INTERNAL MEDICINE

## 2025-03-21 PROCEDURE — 1123F ACP DISCUSS/DSCN MKR DOCD: CPT | Performed by: INTERNAL MEDICINE

## 2025-03-21 ASSESSMENT — PATIENT HEALTH QUESTIONNAIRE - PHQ9
1. LITTLE INTEREST OR PLEASURE IN DOING THINGS: NOT AT ALL
SUM OF ALL RESPONSES TO PHQ9 QUESTIONS 1 AND 2: 0
2. FEELING DOWN, DEPRESSED OR HOPELESS: NOT AT ALL

## 2025-03-21 NOTE — PROGRESS NOTES
"Patient: Jabari Swan  : 1955  PCP: Everett Rivera MD  MRN: [id number]  Program: Chronic Care Management (CMS)  Status: Enrolled  Effective Dates: 3/21/2024 - present  Responsible Staff: Roseanne Kong RN  Social Drivers to be Addressed: No information to display    Transitional Care Management  Status: Enrolled  Effective Dates: 3/11/2025 - present  Responsible Staff: Roseanne Kong RN  Social Drivers to be Addressed: Alcohol Use, Financial Resource Strain, Physical Activity, Social Connections, Stress, Tobacco Use      Here for follow-up after his recent hospitalization overnight 3/9/2025, discharged 3/10/2025, here with his wife.  Diagnosed with UTI when he had worsening fatigue and somnolence.  Treated with antibiotics.  He had felt terrible chills and overall just not feeling well before going to the ER by squad    Since discharge doing better-eating well without nausea vomiting diarrhea     Jabari Swan is a 70 y.o. male presenting today for follow-up after being discharged from the hospital 11 days ago. The main problem requiring admission was UTI. The discharge summary and/or Transitional Care Management documentation was reviewed. Medication reconciliation was performed as indicated via the \"Edgardo as Reviewed\" timestamp.     Jabari Swan was contacted by Transitional Care Management services two days after his discharge. This encounter and supporting documentation was reviewed.    Review of Systems    /84 (BP Location: Right arm, Patient Position: Sitting, BP Cuff Size: Large adult)   Pulse 81   Ht 1.829 m (6')   Wt 88.3 kg (194 lb 9.6 oz)   SpO2 97%   BMI 26.39 kg/m²     Physical Exam  Constitutional:       Comments: Very pleasant gentleman in no distress, in a wheelchair accompanied by his wife  Eye exam revealed pupils equally round and reactive, with extraocular muscles intact. Normal sclera and eyelids.  Neck exam revealed no masses, adenopathy or thyromegaly. No neck pain on range of " motion was noted.  Pulmonary exam revealed clear breath sounds bilaterally in all lung fields. No wheezes bronchitis or rales noted.  Cardiac exam revealed I/VI systolic ejection murmur, without gallops or rubs.  No back flank or abdominal discomfort  Right leg examined without a sock on-transmetatarsal amputation site remarkable-well-healed  Stasis pigmentation changes right medial and lateral calf healthy without active cellulitis or ulcerations         The complexity of medical decision making for this patient's transitional care is moderate.    Assessment/Plan   Problem List Items Addressed This Visit             ICD-10-CM    Atherosclerosis of coronary artery of native heart I25.10    Essential hypertension with goal blood pressure less than 140/90 I10    Hypothyroidism E03.9    Paroxysmal atrial fibrillation (Multi) I48.0    Prediabetes R73.03    Stasis edema with inflammation I87.329    Urinary urgency R39.15    Relevant Orders    Urinalysis with Reflex Microscopic (Completed)    Vitamin D deficiency E55.9    PVD (peripheral vascular disease) (CMS-[facility]) I73.9    Venous stasis dermatitis of both lower extremities I87.2     Other Visit Diagnoses         Codes    Urgency of urination    -  Primary R39.15    Relevant Orders    Urine Culture (Completed)    Elevated lipase     R74.8    Relevant Orders    Hepatic Function Panel (Completed)    Lipase (Completed)    Amylase (Completed)    Hemiplegia and hemiparesis following cerebral infarction affecting right dominant side (Multi)     I69.351               Portions of this encounter note have been copied from my previous note dated 1/10/25  , which have been updated where appropriate and all reflect my current medical decision making from today.         Home situation-he lives with his wife.  He has been debilitated since his CVA years ago.  He does not drive.  They live in a split-level house-so he is living on 3 levels.  He has a walker on each level.  He has  banisters up and down both sets of stairs.  He does not need any help in the bathroom-they have a shower seat.           Although his wife does all the shopping and most of the housework, Jabari can vacuum, cooks and does laundry    Care planning-his wife is extraordinarily helpful-she was here today during the visit.  She will continue her remarkable efforts and contact me by phone or Roseonlyhart messaging with any additional concerns.             We spoke at length today regarding home issues including his medication, and other home concerns accordingly.    Labs reviewed from 3/10/2025, 3/9/2025,  X-rays from 3/9 chest x-ray reviewed    Patient was recently discharged from an inpatient facility. Discharge medication list reviewed and reconciled with her electronic medical record medication list. Discharge orders reviewed, and we'll continue to follow active medical problems as outlined in this note.    UTI with systemic symptoms with worsening fatigue and lethargy-admitted overnight with hydration and IV antibiotics-discharged with Keflex-doing better he will recheck urine studies and accordingly    Elevated lipase-will reassess-presently otherwise no intestinal symptoms    Dehydration-associated with his recent UTI-improved-will continue to push fluids    S/p ER visit 1/3/2025-with diarrhea and dehydration-improved with IV fluid.  Linzess was provided but discontinued.    S/p hospitalization 12/30/2024-12/31/2024-with diarrhea and dehydration-from the recent antibiotic for cellulitis-improved presently    Bilateral calf and left foot erythema-we spoke at length regarding the need to continue to watch this.  He has been on a home skin care plan mainly with Aquaphor moisturizer but also intermittently with triamcinolone cream for the stasis dermatitis and irritation issues from his very tight compression hose.  His wife had used the triamcinolone cream once or twice last week but clearly there is some irritation on the  right calf.  Will start with mupirocin twice daily to the areas that are red.  She will call in the next 72 hours with an update on his symptoms we will transition over the next week towards triamcinolone cream twice daily for 7 to 10 days and then return to [facility].  They will continue daily feet exams and podiatry follow-up accordingly            10/24-as above status post ER visit over the weekend with IV antibiotics provided.  He will finish the cephalexin           1/10/2025-at this point the main focus was his right calf-he has a medial port that has chronic stasis pigmentation changes and a anterior lateral part that appears chronically tan-with stasis pigmentation changes but not as dark.  We discussed her new routine-  Before dinner he will remove his compression hose  After dinner he will do the 60-minute pneumatic compression cycle and then remove the plastic sleeves  On the right calf they will use triamcinolone nightly  On the left calf they will use the Eucerin  On each calf, each morning they will use the Eucerin  He will continue the compression hose during the day            3/25-doing better-taking off his compression hose after dinner and using the Eucerin more often along with the triamcinolone cream.  They will continue the plan as outlined above                      Gait unsteadiness/history of falls-he will use caution with his walker in his split-level house.  They have a lot of handles in the bathrooms and a shower seat along with other adaptive measures to help including banisters up both sides with the 2 sets of stairs.  They have a walker at each level              3/25-fortunately no recent falls    Exercise routine-is really not doing much.  Encouraged chair exercises 5 days weekly with either home video or exercise routine    S/p RSV pneumonia-improved presently.  He will look to get the RSV vaccination later this fall              RSV vaccination updated 9/4/2024    S/p  hospitalization with cellulitis 2/10/2024, discharged 2/16/2024 into rehab.  He was discharged from rehab 3 days ago, on Friday, 3/8/2024 with left leg cellulitis requiring cephalexin    Recurrent stasis cellulitis-mupirocin refilled.  His left compression hose is extraordinarily difficult.  We try to get this saw and it was a great struggle.  I strongly encouraged him to work with home supply service care to get zippered compression hose.  He is scheduled to see his dermatologist, Dr. Villalobos 3/20/2024 and his podiatrist this next week              9/24-as above we will continue topical efforts, and podiatry follow-up as well               10/24-he has been to the wound clinic a week ago which provided topical gentamicin then in the ER with worsening right posterior calf redness associated with several linear excoriations-he received IV antibiotics and discharged earlier today from the ER.  Improved clinically.  Clearly his tight compression hose remain an issue-it appears that he needs compression hose that are not as compressive.  His wife will clarify the present strength and will reduce that through his supply store.  He will follow-up with the wound clinic as needed               3/25-fortunately no recent cellulitis issues.  Once again encouraged consistent care as above      Stasis dermatitis longstanding lymphedema/stasis cellulitis concerns -             Improved presently. He is on a stronger compression-he is able to put his socks on himself each morning. Uses triamcinolone cream and CeraVe -as recommended by the dermatologist, Dr. Villalobos who they will see as needed           Vascular surgery reevaluation 7/23 after ER visit.  Ultrasound completed          9 /24-encouraged triamcinolone cream to evenings weekly, at least an hour or so before putting on the vacuum compression device that he sleeps with             10/24-he will continue the present plan as above and use compression hose that are not as  difficult to get off so that he does not scrape the skin getting these on or off              3/25-skin care continues mainly focusing on the moisturizer       Longitudinal excoriations-from working to get his compression hose on and off-will change to a less compressive strength compression hose prescription.  Strongly encouraged him to use more caution pulling on and taking off his compression hose the follow-up with concerns    Edema -longstanding-stable, compression hose daily, also uses a pump in the evening, continue Lasix as directed - this was recently decreased from twice daily by nephrology. Patient encouraged to get a scale so that he can monitor weight at home and keep log.    His wife states that his nephrologist suggested reducing the furosemide a bit-they will take 1 tablet Monday Wednesday Friday, 2 tablets on the other days and start doing weights at home              10/24-as above will reduce the compression on his stockings and will follow this accordingly           3/25-edema actually doing fairly well    Podiatry care with history of recurrent Left foot cellulitis /multiple amputations- he will try to keep this open to the air during the day to help keep this dry between the toes.            He will see his podiatrist, Dr. Thurston every 12 wks., no recent problems with consistent care.  He saw him 4/26/2024      Positive p-ANCA blood test-slightly elevated-additional labs ordered              Follow-up labs nondiagnostic      S/p hospitalization 1/20/2024-1/26/2024 with acute hypoxic respiratory failure associated with RSV infection-now at home this week-clinically improved though he still has a significant cough with postnasal drip.  He did have a fiberoptic bronchoscopy the day of discharge. oxygen saturations at home typically in the 94 to 95% though a bit lower this morning.  His wife has been very attentive to his pulmonary and congestion medications.  Additionally he has been on  doxycycline.  His albuterol nebulizers initially were 4 times daily now 3 times daily steroid taper and will continue.  She will continue the Mucinex maximum strength DM with ample fluids twice daily    Pulmonology care-he was scheduled to follow-up with Dr. So February 2/18/2024 in pulmonology this month-PFTs and a sleep study have been planned, following his bronchoscopy 1/26/2024 while admitted.    Postnasal drip-he will continue the Flonase twice daily and restart Zyrtec twice daily.    Home health care-             Home health care presently has been concluded    S/p overnight hospitalization 12/10/2023 with dizziness and bradycardia after a bowel movement-rather than vasovagal this was attributed to metoprolol effects.  Metoprolol has been discontinued and he has no subsequent symptoms.          Early January 2024-Home blood pressure log with systolic values typically between 1 25-1 35 systolic, with pulse in the low 60 range.  He is scheduled to see Dr. Foley in electrocardiology 2/2/2024.  His wife will continue to follow blood pressure and pulse and take that data in with their visit in 3 weeks.    S/p ER visit 7/7/2023 for right leg cellulitis concern-the ER placed him on Keflex.  He saw vascular surgery-stasis congestion suspected-venous ultrasound has been ordered-plan for tomorrow    S/p  hospitalization-with acute stay March 5 through 8, followed by rehab March 8 to 19.  At home again.  He will continue ongoing home exercises and therapy as needed fortunately improved     P. A-Fib/Arrhythmia - cardiologist, Dr. Foley has started metoprolol 25 mg one time a day. No dizziness.  Stable clinically from a rhythm standpoint. He will continue his medications and cardiology follow-up            Stable, saw Dr. Foley  Nov '23.  Anticipate annual follow-up this fall    Anticoagulation-chronic with Eliquis-no complications with this     Pulmonary hypertension - echo April 2022 did show mild to moderate  pulmonary hypertension but his cardiologist, did not feel that this was an issue. Otherwise normal EF without worrisome valvular findings on that echo     Bradycardia - discussed with wife in detail-although his heart rate gets into the high 40 range sometime he is not symptomatic. They reviewed this with Dr. Carnes who suggested reducing metoprolol succinate 25-1/2 tablet daily. Pulse rate has increased from the high 40s up into the 50 range. Recently between 55-64          1/24-off metoprolol pulse in the low 60 range     hypertension/chronic kidney disease-he will continue medications and nephrology follow-up with Dr. Sahu's successor, Dr. Contreras. He'll see her semiannually.             They will follow-up with Dr. Gutierrez semiannually             January 2024-blood pressure and pulse remain improved/stable off low-dose metoprolol.  Labs have been done for the visit with the nephrologist next week             8/24-blood pressure improved              10/24-blood pressure slightly elevated.  Normal creatinine 0.76        Parkinson's - not problematic, without noticeable tremor with medications per Dr. Peters in neurology- just saw last month; cont. semiannual visits. Overall doing well from this standpoint. To see Dr. Peters semiannually. Tremor not obvious today and exam. Doing well presently. semiannual visits planned. Tolerating medications. No recent concerns stable course.   He will see Dr. Lyle annually -each October. Clinically stable    Multiple medications-patient is on multiple medications. Medication list reviewed with the patient and reconciled and updated in the EMR accordingly. Ongoing efforts to minimize the number of medications medications as well as optimize dosing and compliance continues to be an ongoing priority.          5/24-medication list reviewed       Remote intracranial bleed/right-sided hemiparesis/gait unsteadiness - stable course. He's functionally independent for the most part  at home. His wife continues to be extremely supportive accordingly. He remained stable fortunately.  His arms have normal strength, as does his left leg.  His right leg remains partially paralyzed                He will continue caution ambulating and continue exercises and stretches to maintain strength stamina as well as ambulation conditioning. He uses a walker at home typically fortunately no recent falls. Physical therapy continues on an as-needed basis     Hypothyroidism - we'll continue to follow thyroid labs semi-annually   Sept '22 TSH normal, continue present regimen.           4/24-TSH stable              9/24-TSH ordered     Prediabetes /elevated weight- monitoring on semiannual basis   Jan '22, A1c 6.1%   Sept '22, A1c 6.1%, stable from prior, discussed weight loss efforts today.        4/29/2024-A1c 5.6%, improved from 5.9%.  BMI 29.6          9/24-lipids ordered     Dyslipidemia/elevated weight -goal LDL under 100   Sept 2022 lipids slightly elevated but overall stable. Health lifestyle measures encouraged. He will continue to follow-up with cardiology (Dr. Foley) accordingly.               9/24-lipids ordered    Postnasal drip-he will continue the Flonase and restart Zyrtec     History of Recurrent pneumonia/with hospitalization again with another rehabilitation stay-    Improved presently no shortness of breath. Occas cough. Using incentive spirometer a 3 times daily     Anemia-iron deficiency concern-             Late April 2024-H&H 12.6 39.6-he will call his gastroenterologist to see if an EGD/colonoscopy is needed sooner than scheduled            H&H normal earlier today 10/25/2024    History of acute renal failure-improved in hospital with hydration-creatinine 0.87 4/29/2024.  He follows with Ai Gutierrez in nephrology, in 3/24, and 5,24               Will continue to follow labs     Urinary incontinence/BPH/nephrolithiasis/hypogonadism will continue medications per urology - He follows with   Carola in urology--              No recent concerns    Vitamin D deficiency - encouraged patient to continue vitamin D daily as ordered. Will consider vitamin D level regularly.   Sept '22 vitamin D stable/normal.    H. Pylori - 6/24 - s/p treatment, and follow up breath test OK     Colon care/colon polyps -  He had 2 tubular adenomatous polyps on his 6/24 colonoscopy.            Next in 3 years-Jun '27        Skin care/scalp psoriasis/eczema - per Dr. Amaya annually or as needed. UTD.      Dental care / upper partial - continue every 6 months      Elevated IOP / Vision care/bilateral cataract/glasses -  visits continue. He will see Dr. Riley August 2021 to address his bilateral cataracts.           At this point the cataracts are not ready to be removed. They will continue to see her regularly              S/p right laser procedure, left laser eye  in Aug '23 for pressure concerns.            Eye visits UTD semiannually w/ Dr. Riley. Bilat cataracts                   Flu shot - each fall after Labor Day encouraged-          Updated 9/13/2024    COVID booster-11/23            Encouraged considering getting the new COVID booster this fall when available.      Tdap booster-will be needed this spring          5/24- ordered            8/24-encouraged    RSV vaccination-9/24    Shingrix series-completed 2018    Follow-up in 4 months, sooner as needed-             Charting was completed using voice recognition technology and may include unintended errors.

## 2025-03-23 LAB
ALBUMIN SERPL-MCNC: 4.1 G/DL (ref 3.6–5.1)
ALBUMIN/GLOB SERPL: 1.9 (CALC) (ref 1–2.5)
ALP SERPL-CCNC: 67 U/L (ref 35–144)
ALT SERPL-CCNC: 6 U/L (ref 9–46)
AMYLASE SERPL-CCNC: 48 U/L (ref 21–101)
APPEARANCE UR: CLEAR
AST SERPL-CCNC: 11 U/L (ref 10–35)
BACTERIA #/AREA URNS HPF: ABNORMAL /HPF
BACTERIA UR CULT: NORMAL
BILIRUB DIRECT SERPL-MCNC: 0.1 MG/DL
BILIRUB INDIRECT SERPL-MCNC: 0.5 MG/DL (CALC) (ref 0.2–1.2)
BILIRUB SERPL-MCNC: 0.6 MG/DL (ref 0.2–1.2)
BILIRUB UR QL STRIP: NEGATIVE
COLOR UR: YELLOW
GLOBULIN SER CALC-MCNC: 2.2 G/DL (CALC) (ref 1.9–3.7)
GLUCOSE UR QL STRIP: NEGATIVE
HGB UR QL STRIP: NEGATIVE
HYALINE CASTS #/AREA URNS LPF: ABNORMAL /LPF
KETONES UR QL STRIP: NEGATIVE
LEUKOCYTE ESTERASE UR QL STRIP: NEGATIVE
LIPASE SERPL-CCNC: 24 U/L (ref 7–60)
NITRITE UR QL STRIP: NEGATIVE
PH UR STRIP: 6 [PH] (ref 5–8)
PROT SERPL-MCNC: 6.3 G/DL (ref 6.1–8.1)
PROT UR QL STRIP: ABNORMAL
RBC #/AREA URNS HPF: ABNORMAL /HPF
SERVICE CMNT-IMP: ABNORMAL
SP GR UR STRIP: 1.01 (ref 1–1.03)
SQUAMOUS #/AREA URNS HPF: ABNORMAL /HPF
WBC #/AREA URNS HPF: ABNORMAL /HPF

## 2025-03-25 DIAGNOSIS — R39.15 URINARY URGENCY: ICD-10-CM

## 2025-03-25 DIAGNOSIS — R39.15 URGENCY OF URINATION: Primary | ICD-10-CM

## 2025-03-25 NOTE — RESULT ENCOUNTER NOTE
Jabari / Berta    Thanks for coming in late last week for your appointment and for getting to the labs to do the urine and blood work.    I am glad that the follow-up blood work shows that the liver labs as well as the pancreas labs including amylase and lipase are normal.  We do not need to do any extra blood work at this time.    The urine analysis was negative without signs of infection or inflammation.  The urine culture did not show any predominant growth of any 1 species.  However the lab result did state that there was some low-grade growth, and if further testing appears necessary, repeating another urine analysis focusing on clean-catch technique would be important.      I have put orders in the computer for you to do another urine test over the next 2 weeks or so.  Paperwork is not needed.  Please review with the , the best way for a clean-catch urine specimen, including obtaining a midstream urine test.  This means that the first amount of urine goes into the toilet, while the midstream urine is collected in the urine collection bottle.    I will send you the follow-up urine test when I see them.    Thanks again for getting to the lab accordingly.    Sincerely,  Everett Rivera MD

## 2025-03-26 PROBLEM — I27.20 PULMONARY HYPERTENSION (MULTI): Status: RESOLVED | Noted: 2024-02-03 | Resolved: 2025-03-26

## 2025-04-01 ENCOUNTER — PATIENT OUTREACH (OUTPATIENT)
Dept: PRIMARY CARE | Facility: CLINIC | Age: 70
End: 2025-04-01
Payer: MEDICARE

## 2025-04-01 DIAGNOSIS — I48.0 PAROXYSMAL ATRIAL FIBRILLATION (MULTI): ICD-10-CM

## 2025-04-01 DIAGNOSIS — I69.351 HEMIPLEGIA AND HEMIPARESIS FOLLOWING CEREBRAL INFARCTION AFFECTING RIGHT DOMINANT SIDE (MULTI): ICD-10-CM

## 2025-04-01 DIAGNOSIS — E03.9 ACQUIRED HYPOTHYROIDISM: ICD-10-CM

## 2025-04-01 RX ORDER — LEVOTHYROXINE SODIUM 25 UG/1
25 TABLET ORAL EVERY MORNING
Qty: 90 TABLET | Refills: 1 | Status: SHIPPED | OUTPATIENT
Start: 2025-04-01

## 2025-04-01 NOTE — PROGRESS NOTES
I received a call from the patients wife Berta.  Per Berta she needs a refill for her husbands synthroid.  Refill request sent to Dr Rivera for [facility].   Per Berta her  is doing well.  There is no change in his health since we last spoke.  Patient is trying to stay active.  Instructed to call with any questions or concerns.

## 2025-04-07 ENCOUNTER — TELEPHONE (OUTPATIENT)
Dept: PRIMARY CARE | Facility: CLINIC | Age: 70
End: 2025-04-07
Payer: MEDICARE

## 2025-04-07 DIAGNOSIS — R09.82 POST-NASAL DRIP: Primary | ICD-10-CM

## 2025-04-07 RX ORDER — FLUTICASONE PROPIONATE 50 MCG
SPRAY, SUSPENSION (ML) NASAL
Qty: 16 G | Refills: 3 | Status: SHIPPED | OUTPATIENT
Start: 2025-04-07

## 2025-04-07 NOTE — TELEPHONE ENCOUNTER
Patient was seen in urgent care yesterday for sore throat. Was advised to follow up with pcp.        Patient's wife advised he is much worse today would like some direction

## 2025-04-07 NOTE — TELEPHONE ENCOUNTER
Spoke with spouse yesterday he was in urgent care-negative strep throat negative COVID testing and influenza testing.  Postnasal drip is an issue.  Encouraged Zyrtec daily and Flonase consistently twice daily for postnasal drip with the spring allergy season approaching.  He will follow-up as scheduled he will call with additional questions.  Him with his blood pressure he cannot use decongestants

## 2025-04-08 ENCOUNTER — PATIENT OUTREACH (OUTPATIENT)
Dept: PRIMARY CARE | Facility: CLINIC | Age: 70
End: 2025-04-08
Payer: MEDICARE

## 2025-04-08 DIAGNOSIS — R39.15 URINARY URGENCY: ICD-10-CM

## 2025-04-08 DIAGNOSIS — R39.15 URGENCY OF URINATION: Primary | ICD-10-CM

## 2025-04-08 DIAGNOSIS — I10 ESSENTIAL HYPERTENSION WITH GOAL BLOOD PRESSURE LESS THAN 140/90: ICD-10-CM

## 2025-04-08 DIAGNOSIS — R09.82 POST-NASAL DRIP: ICD-10-CM

## 2025-04-08 NOTE — PROGRESS NOTES
I received a phone call from the patient's wife Berta.  Per Berta, Jabari has a non productive cough.   No fever per Berta.      Jabari was in the ED last night and was told he had a benign viral process.  No antibiotics required,  and he was discharged to home.      Berta is asking for cough medicine for Jabari.  Please advise.

## 2025-04-12 LAB
APPEARANCE UR: CLEAR
BACTERIA #/AREA URNS HPF: ABNORMAL /HPF
BACTERIA UR CULT: NORMAL
BILIRUB UR QL STRIP: NEGATIVE
COLOR UR: YELLOW
GLUCOSE UR QL STRIP: NEGATIVE
HGB UR QL STRIP: NEGATIVE
HYALINE CASTS #/AREA URNS LPF: ABNORMAL /LPF
KETONES UR QL STRIP: NEGATIVE
LEUKOCYTE ESTERASE UR QL STRIP: NEGATIVE
NITRITE UR QL STRIP: NEGATIVE
PH UR STRIP: 7 [PH] (ref 5–8)
PROT UR QL STRIP: ABNORMAL
RBC #/AREA URNS HPF: ABNORMAL /HPF
SERVICE CMNT-IMP: ABNORMAL
SP GR UR STRIP: 1.01 (ref 1–1.03)
SQUAMOUS #/AREA URNS HPF: ABNORMAL /HPF
WBC #/AREA URNS HPF: ABNORMAL /HPF

## 2025-04-14 ENCOUNTER — TELEPHONE (OUTPATIENT)
Dept: PRIMARY CARE | Facility: CLINIC | Age: 70
End: 2025-04-14
Payer: MEDICARE

## 2025-04-14 LAB
APPEARANCE UR: CLEAR
BACTERIA #/AREA URNS HPF: ABNORMAL /HPF
BACTERIA UR CULT: ABNORMAL
BILIRUB UR QL STRIP: NEGATIVE
COLOR UR: YELLOW
GLUCOSE UR QL STRIP: NEGATIVE
HGB UR QL STRIP: NEGATIVE
HYALINE CASTS #/AREA URNS LPF: ABNORMAL /LPF
KETONES UR QL STRIP: NEGATIVE
LEUKOCYTE ESTERASE UR QL STRIP: NEGATIVE
NITRITE UR QL STRIP: NEGATIVE
PH UR STRIP: 7 [PH] (ref 5–8)
PROT UR QL STRIP: ABNORMAL
RBC #/AREA URNS HPF: ABNORMAL /HPF
SERVICE CMNT-IMP: ABNORMAL
SP GR UR STRIP: 1.01 (ref 1–1.03)
SQUAMOUS #/AREA URNS HPF: ABNORMAL /HPF
WBC #/AREA URNS HPF: ABNORMAL /HPF

## 2025-04-15 ENCOUNTER — PATIENT OUTREACH (OUTPATIENT)
Dept: PRIMARY CARE | Facility: CLINIC | Age: 70
End: 2025-04-15
Payer: MEDICARE

## 2025-04-16 NOTE — TELEPHONE ENCOUNTER
Spoke with spouse at length-explained opinion of our infectious disease physician who feels a straight cath for a better urine specimen is needed in light of the normal urine analysis which does not actually show any signs of infection presently  though there are 2  bacteria in the urine culture.    For now we will work to get home health out to do a straight cath for a better specimen.  She will continue to encourage fluids and call with additional concerns

## 2025-04-16 NOTE — TELEPHONE ENCOUNTER
Wife called for pt- She is wondering if pt needs to go to the hosp for UTI? Pt was told they would hear by 4/15

## 2025-04-17 ENCOUNTER — DOCUMENTATION (OUTPATIENT)
Dept: HOME HEALTH SERVICES | Facility: HOME HEALTH | Age: 70
End: 2025-04-17
Payer: MEDICARE

## 2025-04-17 ENCOUNTER — HOME HEALTH ADMISSION (OUTPATIENT)
Dept: HOME HEALTH SERVICES | Facility: HOME HEALTH | Age: 70
End: 2025-04-17
Payer: MEDICARE

## 2025-04-17 ENCOUNTER — TELEPHONE (OUTPATIENT)
Dept: HOME HEALTH SERVICES | Facility: HOME HEALTH | Age: 70
End: 2025-04-17
Payer: MEDICARE

## 2025-04-17 NOTE — TELEPHONE ENCOUNTER
Notified by home health earlier today that they do not do this straight cath routine    Called and notified spouse.  They will try to do another clean-catch UA and C&S at the lab and they will also set up an appointment with his urologist, Dr. Srivastava

## 2025-04-17 NOTE — TELEPHONE ENCOUNTER
Review of recent comments from infectious disease input;    The Acinetobacter is not common and not amenable to oral agent. The E. faecalis is Amp and Nitro susceptible however. IF he is stable, albeit frail, I would not treat but if concern is high enough then I would suggest obtaining a straight cath urine for UA and culture. These are hard cases for sure and not sure I helped but multiple organisms suggest possible contamination or from a baker. No WBC in UA is reassuring

## 2025-04-17 NOTE — TELEPHONE ENCOUNTER
Home Care received a referral for Nursing. Unfortunately, we are unable to accept and process the referral at this time.    Reason:  NOT A SKILLED SERVICE , SPECIMEN COLLECTION     Patients, please reach out to the referring provider or your PCP to assist in obtaining an alternative home care agency and/or guidance to meet your needs.    Providers, please reach out to [facility] at [phone] with any questions regarding the declined referral.

## 2025-04-17 NOTE — TELEPHONE ENCOUNTER
"Additional input from infectious disease;    Could try Nitrofurantoin. Sometimes killing one bug helps, but truthfully if UA negative I would not treat. Hard to  especially in frail patients but also want to avoid unnecessary abx exposure when possible. Maybe home health person could help with sterile clean catch urine collection - which might help avoid contamination. I would add comment in UA and culture order \"add Fosfomycin testing if culture positive\".   "

## 2025-04-17 NOTE — HH CARE COORDINATION
Home Care received a referral for Nursing. Unfortunately, we are unable to accept and process the referral at this time.    Reason:  NOT SKILLED SERVICE, SPECIMEN COLLECTION     Patients, please reach out to the referring provider or your PCP to assist in obtaining an alternative home care agency and/or guidance to meet your needs.    Providers, please reach out to [facility] at [phone] with any questions regarding the declined referral.

## 2025-04-17 NOTE — TELEPHONE ENCOUNTER
Home health order placed for home straight cath specimen for urine analysis and urine culture to include fosfomycin testing  if culture positive

## 2025-04-18 ENCOUNTER — TELEPHONE (OUTPATIENT)
Dept: PRIMARY CARE | Facility: CLINIC | Age: 70
End: 2025-04-18
Payer: MEDICARE

## 2025-04-18 NOTE — TELEPHONE ENCOUNTER
Fyi pt wife called- they did leave urine downstairs at lab. Also scheduled with urologist for 4/28

## 2025-04-20 LAB
APPEARANCE UR: CLEAR
BACTERIA UR CULT: NORMAL
BILIRUB UR QL STRIP: NEGATIVE
COLOR UR: YELLOW
GLUCOSE UR QL STRIP: NEGATIVE
HGB UR QL STRIP: NEGATIVE
KETONES UR QL STRIP: NEGATIVE
LEUKOCYTE ESTERASE UR QL STRIP: NEGATIVE
NITRITE UR QL STRIP: NEGATIVE
PH UR STRIP: 7 [PH] (ref 5–8)
PROT UR QL STRIP: NEGATIVE
SP GR UR STRIP: 1.01 (ref 1–1.03)

## 2025-04-21 ENCOUNTER — PATIENT OUTREACH (OUTPATIENT)
Dept: PRIMARY CARE | Facility: CLINIC | Age: 70
End: 2025-04-21
Payer: MEDICARE

## 2025-04-21 DIAGNOSIS — I10 ESSENTIAL HYPERTENSION WITH GOAL BLOOD PRESSURE LESS THAN 140/90: ICD-10-CM

## 2025-04-21 DIAGNOSIS — I48.0 PAROXYSMAL ATRIAL FIBRILLATION (MULTI): ICD-10-CM

## 2025-04-21 NOTE — PROGRESS NOTES
Discharge facility: [facility]  Discharge diagnosis: Diarrhea  Admission date: 4/18/2025  Discharge date: 4/20/2025    PCP Appointment Date: 4/25/2025  Specialist Appointment Date: Wife to schedule with Dr Barboza  [facility] Encounter and Summary: Linked  See Discharge assessment below for further details    Medications  Medications reviewed with patient/caregiver?: Yes (4/21/2025  1:31 PM)  Is the patient having any side effects they believe may be caused by any medication additions or changes?: No (4/21/2025  1:31 PM)  Does the patient have all medications ordered at discharge?: Yes (4/21/2025  1:31 PM)  Care Management Interventions: Provided patient education (4/21/2025  1:31 PM)  Is the patient taking all medications as directed (includes completed medication regime)?: Yes (4/21/2025  1:31 PM)  Care Management Interventions: Provided patient education (4/21/2025  1:31 PM)    Appointments  Does the patient have a primary care provider?: Yes (4/21/2025  1:31 PM)  Care Management Interventions: Verified appointment date/time/provider (4/21/2025  1:31 PM)  Has the patient kept scheduled appointments due by today?: Yes (4/21/2025  1:31 PM)  Care Management Interventions: Advised patient to keep appointment (4/21/2025  1:31 PM)    Self Management  What is the home health agency?: NA (4/21/2025  1:31 PM)  Has home health visited the patient within 72 hours of discharge?: Not applicable (4/21/2025  1:31 PM)    Patient Teaching  Does the patient have access to their discharge instructions?: Yes (4/21/2025  1:31 PM)  Care Management Interventions: Reviewed instructions with patient (4/21/2025  1:31 PM)  What is the patient's perception of their health status since discharge?: Improving (4/21/2025  1:31 PM)  Is the patient/caregiver able to teach back the hierarchy of who to call/visit for symptoms/problems? PCP, Specialist, Home Health nurse, Urgent Care, ED, 911: Yes (4/21/2025  1:31 PM)  Patient/Caregiver  Education Comments: I spoke with the patient wife who told me Jabari was admitted to [facility] on 4/18/2025 and discharged on 4/20/2025.  The patient was taken to the hospital by squad.   Patient is much better at home.  He has had normal BM's.  He is slowly increasing his fiber and taking Metamucil as directed.  The wife will call and schedule a follow up with Dr Barboza.  Instructed to call with nay questions or concerns.  TCM scheduled for 4/25/2025 with Dr Rivera. (4/21/2025  1:31 PM)     TCM outreach completed on : 4/21/2025  Discharge date: 4/20/2025  Pt has follow up on : 4/25/2025    Moderately complex & follow-up within 14 days- bill [id number] for hospital follow up.   Highly complex and follow-up visit within 7 days-can bill [id number] for hospital follow up    *virtual follow up needs modifier added (95 or GT)   *AWV AND TCM CAN BE BILLED TOGETHER WITH 25 MODIFIER

## 2025-04-25 ENCOUNTER — OFFICE VISIT (OUTPATIENT)
Dept: PRIMARY CARE | Facility: CLINIC | Age: 70
End: 2025-04-25
Payer: MEDICARE

## 2025-04-25 VITALS
HEIGHT: 72 IN | BODY MASS INDEX: 26.39 KG/M2 | HEART RATE: 60 BPM | SYSTOLIC BLOOD PRESSURE: 132 MMHG | DIASTOLIC BLOOD PRESSURE: 90 MMHG | OXYGEN SATURATION: 98 %

## 2025-04-25 DIAGNOSIS — G81.91 HEMIPARESIS, RIGHT (MULTI): ICD-10-CM

## 2025-04-25 DIAGNOSIS — E03.9 ACQUIRED HYPOTHYROIDISM: ICD-10-CM

## 2025-04-25 DIAGNOSIS — I10 ESSENTIAL HYPERTENSION WITH GOAL BLOOD PRESSURE LESS THAN 140/90: Primary | ICD-10-CM

## 2025-04-25 DIAGNOSIS — I69.351 HEMIPLEGIA AND HEMIPARESIS FOLLOWING CEREBRAL INFARCTION AFFECTING RIGHT DOMINANT SIDE (MULTI): ICD-10-CM

## 2025-04-25 DIAGNOSIS — R73.03 PREDIABETES: ICD-10-CM

## 2025-04-25 DIAGNOSIS — N18.31 STAGE 3A CHRONIC KIDNEY DISEASE (MULTI): ICD-10-CM

## 2025-04-25 DIAGNOSIS — I10 ESSENTIAL HYPERTENSION WITH GOAL BLOOD PRESSURE LESS THAN 140/90: ICD-10-CM

## 2025-04-25 DIAGNOSIS — N40.1 BENIGN PROSTATIC HYPERPLASIA WITH NOCTURIA: ICD-10-CM

## 2025-04-25 DIAGNOSIS — K59.00 CONSTIPATION, UNSPECIFIED CONSTIPATION TYPE: ICD-10-CM

## 2025-04-25 DIAGNOSIS — E78.5 DYSLIPIDEMIA: ICD-10-CM

## 2025-04-25 DIAGNOSIS — R26.0 ATAXIC GAIT: ICD-10-CM

## 2025-04-25 DIAGNOSIS — R35.1 BENIGN PROSTATIC HYPERPLASIA WITH NOCTURIA: ICD-10-CM

## 2025-04-25 DIAGNOSIS — I48.0 PAROXYSMAL ATRIAL FIBRILLATION (MULTI): Primary | ICD-10-CM

## 2025-04-25 PROCEDURE — 1123F ACP DISCUSS/DSCN MKR DOCD: CPT | Performed by: INTERNAL MEDICINE

## 2025-04-25 PROCEDURE — 1159F MED LIST DOCD IN RCRD: CPT | Performed by: INTERNAL MEDICINE

## 2025-04-25 PROCEDURE — 99495 TRANSJ CARE MGMT MOD F2F 14D: CPT | Performed by: INTERNAL MEDICINE

## 2025-04-25 PROCEDURE — 1160F RVW MEDS BY RX/DR IN RCRD: CPT | Performed by: INTERNAL MEDICINE

## 2025-04-25 PROCEDURE — 3075F SYST BP GE 130 - 139MM HG: CPT | Performed by: INTERNAL MEDICINE

## 2025-04-25 PROCEDURE — 3080F DIAST BP >= 90 MM HG: CPT | Performed by: INTERNAL MEDICINE

## 2025-04-25 RX ORDER — DOCUSATE SODIUM 100 MG/1
100 CAPSULE, LIQUID FILLED ORAL 2 TIMES DAILY
Qty: 60 CAPSULE | Refills: 3 | Status: SHIPPED | OUTPATIENT
Start: 2025-04-25

## 2025-04-25 RX ORDER — LOSARTAN POTASSIUM 100 MG/1
100 TABLET ORAL DAILY
Qty: 100 TABLET | Refills: 3 | Status: SHIPPED | OUTPATIENT
Start: 2025-04-25 | End: 2026-05-30

## 2025-04-25 RX ORDER — DOCUSATE SODIUM 100 MG/1
100 CAPSULE, LIQUID FILLED ORAL 2 TIMES DAILY
Qty: 180 CAPSULE | Refills: 3 | Status: SHIPPED | OUTPATIENT
Start: 2025-04-25 | End: 2025-04-25 | Stop reason: SDUPTHER

## 2025-04-25 RX ORDER — AMLODIPINE BESYLATE 5 MG/1
5 TABLET ORAL DAILY
Qty: 90 TABLET | Refills: 3 | Status: SHIPPED | OUTPATIENT
Start: 2025-04-25 | End: 2026-04-20

## 2025-04-25 NOTE — TELEPHONE ENCOUNTER
Pt was in this morning  script for amlodipine ws changed from 10mg to 5mg - pt spouse stated that the pill cutter does not work on these pills - wants a script sent to [facility] for 30 days for amlodipine 5mg and [facility]

## 2025-04-25 NOTE — PROGRESS NOTES
"Patient: Jabari Swan  : 1955  PCP: Everett Rivera MD  MRN: [id number]  Program: Chronic Care Management (CMS)  Status: Enrolled  Effective Dates: 3/21/2024 - present  Responsible Staff: Roseanne Kong RN  Social Drivers to be Addressed: No information to display    Transitional Care Management  Status: Enrolled  Effective Dates: 2025 - present  Responsible Staff: Roseanne Kong RN  Social Drivers to be Addressed: Alcohol Use, Financial Resource Strain, Physical Activity, Social Connections, Stress, Tobacco Use         Jabari Swan is a 70 y.o. male presenting today for follow-up after being discharged from the hospital 5 days ago. The main problem requiring admission was near syncopal episode and diarrhea. The discharge summary and/or Transitional Care Management documentation was reviewed. Medication reconciliation was performed as indicated via the \"Edgardo as Reviewed\" timestamp.     Jabari Swan was contacted by Transitional Care Management services two days after his discharge. This encounter and supporting documentation was reviewed.    Review of Systems    /90 (BP Location: Left arm, Patient Position: Sitting, BP Cuff Size: Large adult)   Pulse 60   Ht 1.829 m (6')   SpO2 98%   BMI 26.39 kg/m²     Physical Exam    The complexity of medical decision making for this patient's transitional care is moderate.    Assessment/Plan   Problem List Items Addressed This Visit           ICD-10-CM    Benign enlargement of prostate N40.0    Chronic kidney disease N18.9    Dyslipidemia E78.5    Essential hypertension with goal blood pressure less than 140/90 I10    Relevant Medications    losartan (Cozaar) 100 mg tablet    Paroxysmal atrial fibrillation (Multi) - Primary I48.0    Prediabetes R73.03     Other Visit Diagnoses         Codes      Hemiplegia and hemiparesis following cerebral infarction affecting right dominant side (Multi)     I69.351      Constipation, unspecified constipation type     " K59.00    Relevant Medications    docusate sodium (Colace) 100 mg capsule               Portions of this encounter note have been copied from my previous note dated 3/21/25  , which have been updated where appropriate and all reflect my current medical decision making from today.         Home situation-he lives with his wife.  He has been debilitated since his CVA years ago.  He does not drive.  They live in a split-level house-so he is living on 3 levels.  He has a walker on each level.  He has banisters up and down both sets of stairs.  He does not need any help in the bathroom-they have a shower seat.           Although his wife does all the shopping and most of the housework, Jabari can vacuum, cooks and does laundry    Care planning-his wife is extraordinarily helpful-she was here today during the visit.  She will continue her remarkable efforts and contact me by phone or [employer] messaging with any additional concerns.             We spoke at length today regarding home issues including his medication, and other home concerns accordingly.    Labs reviewed from 3/10/2025, 3/9/2025,  X-rays from 3/9 chest x-ray reviewed    Patient was recently discharged from an inpatient facility. Discharge medication list reviewed and reconciled with her electronic medical record medication list. Discharge orders reviewed, and we'll continue to follow active medical problems as outlined in this note.    UTI with systemic symptoms with worsening fatigue and lethargy-admitted overnight with hydration and IV antibiotics-discharged with Keflex-doing better he will recheck urine studies and accordingly    Addendum-4/17/2025-his follow-up urine grew 2 species but the urine analysis was normal although it had epithelial cells suggestive of contamination.  Review of this situation with infectious disease strongly recommends holding antibiotics in light of the normal urine analysis for now-but a clean-catch straight cath urine  analysis/culture specimen is needed-to fully evaluate this further.  Unfortunately with his BPH, this problem has a potential to continue for some time not unlike his recurrent cellulitis issues requiring multiple rounds of antibiotics.  In the context of his multiple antibiotic allergies as well as the potential to develop resistant urinary species-it is very important for him to determine if/when antibiotics would be needed for a urinary tract infection, especially in light of his recent clinical episode with worsening fatigue and lethargy resulting in ER visit with diagnosis of UTI and clinical improvement with treatment at that accordingly.              Will ask home health nurse to get out to his house to do a straight cath for further evaluation and management of this very complex situation       Elevated lipase-will reassess-presently otherwise no intestinal symptoms    Dehydration-associated with his recent UTI-improved-will continue to push fluids    S/p ER visit 1/3/2025-with diarrhea and dehydration-improved with IV fluid.  Linzess was provided but discontinued.    S/p hospitalization 12/30/2024-12/31/2024-with diarrhea and dehydration-from the recent antibiotic for cellulitis-improved presently    Bilateral calf and left foot erythema-we spoke at length regarding the need to continue to watch this.  He has been on a home skin care plan mainly with Aquaphor moisturizer but also intermittently with triamcinolone cream for the stasis dermatitis and irritation issues from his very tight compression hose.  His wife had used the triamcinolone cream once or twice last week but clearly there is some irritation on the right calf.  Will start with mupirocin twice daily to the areas that are red.  She will call in the next 72 hours with an update on his symptoms we will transition over the next week towards triamcinolone cream twice daily for 7 to 10 days and then return to Aquaphor.  They will continue daily feet  exams and podiatry follow-up accordingly            10/24-as above status post ER visit over the weekend with IV antibiotics provided.  He will finish the cephalexin           1/10/2025-at this point the main focus was his right calf-he has a medial port that has chronic stasis pigmentation changes and a anterior lateral part that appears chronically tan-with stasis pigmentation changes but not as dark.  We discussed her new routine-  Before dinner he will remove his compression hose  After dinner he will do the 60-minute pneumatic compression cycle and then remove the plastic sleeves  On the right calf they will use triamcinolone nightly  On the left calf they will use the Eucerin  On each calf, each morning they will use the Eucerin  He will continue the compression hose during the day            3/25-doing better-taking off his compression hose after dinner and using the Eucerin more often along with the triamcinolone cream.  They will continue the plan as outlined above                      Gait unsteadiness/history of falls-he will use caution with his walker in his split-level house.  They have a lot of handles in the bathrooms and a shower seat along with other adaptive measures to help including banisters up both sides with the 2 sets of stairs.  They have a walker at each level              3/25-fortunately no recent falls    Exercise routine-is really not doing much.  Encouraged chair exercises 5 days weekly with either home video or exercise routine    S/p RSV pneumonia-improved presently.  He will look to get the RSV vaccination later this fall              RSV vaccination updated 9/4/2024    S/p hospitalization with cellulitis 2/10/2024, discharged 2/16/2024 into rehab.  He was discharged from rehab 3 days ago, on Friday, 3/8/2024 with left leg cellulitis requiring cephalexin    Recurrent stasis cellulitis-mupirocin refilled.  His left compression hose is extraordinarily difficult.  We try to get this  saw and it was a great struggle.  I strongly encouraged him to work with home supply service care to get zippered compression hose.  He is scheduled to see his dermatologist, Dr. Villalobos 3/20/2024 and his podiatrist this next week              9/24-as above we will continue topical efforts, and podiatry follow-up as well               10/24-he has been to the wound clinic a week ago which provided topical gentamicin then in the ER with worsening right posterior calf redness associated with several linear excoriations-he received IV antibiotics and discharged earlier today from the ER.  Improved clinically.  Clearly his tight compression hose remain an issue-it appears that he needs compression hose that are not as compressive.  His wife will clarify the present strength and will reduce that through his supply store.  He will follow-up with the wound clinic as needed               3/25-fortunately no recent cellulitis issues.  Once again encouraged consistent care as above      Stasis dermatitis longstanding lymphedema/stasis cellulitis concerns -             Improved presently. He is on a stronger compression-he is able to put his socks on himself each morning. Uses triamcinolone cream and CeraVe -as recommended by the dermatologist, Dr. Villalobos who they will see as needed           Vascular surgery reevaluation 7/23 after ER visit.  Ultrasound completed          9 /24-encouraged triamcinolone cream to evenings weekly, at least an hour or so before putting on the vacuum compression device that he sleeps with             10/24-he will continue the present plan as above and use compression hose that are not as difficult to get off so that he does not scrape the skin getting these on or off              3/25-skin care continues mainly focusing on the moisturizer       Longitudinal excoriations-from working to get his compression hose on and off-will change to a less compressive strength compression hose prescription.   Strongly encouraged him to use more caution pulling on and taking off his compression hose the follow-up with concerns    Edema -longstanding-stable, compression hose daily, also uses a pump in the evening, continue Lasix as directed - this was recently decreased from twice daily by nephrology. Patient encouraged to get a scale so that he can monitor weight at home and keep log.    His wife states that his nephrologist suggested reducing the furosemide a bit-they will take 1 tablet Monday Wednesday Friday, 2 tablets on the other days and start doing weights at home              10/24-as above will reduce the compression on his stockings and will follow this accordingly           3/25-edema actually doing fairly well    Podiatry care with history of recurrent Left foot cellulitis /multiple amputations- he will try to keep this open to the air during the day to help keep this dry between the toes.            He will see his podiatrist, Dr. Thurston every 12 wks., no recent problems with consistent care.  He saw him 4/26/2024      Positive p-ANCA blood test-slightly elevated-additional labs ordered              Follow-up labs nondiagnostic      S/p hospitalization 1/20/2024-1/26/2024 with acute hypoxic respiratory failure associated with RSV infection-now at home this week-clinically improved though he still has a significant cough with postnasal drip.  He did have a fiberoptic bronchoscopy the day of discharge. oxygen saturations at home typically in the 94 to 95% though a bit lower this morning.  His wife has been very attentive to his pulmonary and congestion medications.  Additionally he has been on doxycycline.  His albuterol nebulizers initially were 4 times daily now 3 times daily steroid taper and will continue.  She will continue the Mucinex maximum strength DM with ample fluids twice daily    Pulmonology care-he was scheduled to follow-up with Dr. So February 2/18/2024 in pulmonology this month-PFTs and a  sleep study have been planned, following his bronchoscopy 1/26/2024 while admitted.    Postnasal drip-he will continue the Flonase twice daily and restart Zyrtec twice daily.    Home health care-             Home health care presently has been concluded    S/p overnight hospitalization 12/10/2023 with dizziness and bradycardia after a bowel movement-rather than vasovagal this was attributed to metoprolol effects.  Metoprolol has been discontinued and he has no subsequent symptoms.          Early January 2024-Home blood pressure log with systolic values typically between 1 25-1 35 systolic, with pulse in the low 60 range.  He is scheduled to see Dr. Foley in electrocardiology 2/2/2024.  His wife will continue to follow blood pressure and pulse and take that data in with their visit in 3 weeks.    S/p ER visit 7/7/2023 for right leg cellulitis concern-the ER placed him on Keflex.  He saw vascular surgery-stasis congestion suspected-venous ultrasound has been ordered-plan for tomorrow    S/p  hospitalization-with acute stay March 5 through 8, followed by rehab March 8 to 19.  At home again.  He will continue ongoing home exercises and therapy as needed fortunately improved     P. A-Fib/Arrhythmia - cardiologist, Dr. Foley has started metoprolol 25 mg one time a day. No dizziness.  Stable clinically from a rhythm standpoint. He will continue his medications and cardiology follow-up            Stable, saw Dr. Foley  Nov '23.  Anticipate annual follow-up this fall    Anticoagulation-chronic with Eliquis-no complications with this     Pulmonary hypertension - echo April 2022 did show mild to moderate pulmonary hypertension but his cardiologist, did not feel that this was an issue. Otherwise normal EF without worrisome valvular findings on that echo     Bradycardia - discussed with wife in detail-although his heart rate gets into the high 40 range sometime he is not symptomatic. They reviewed this with Dr. Carnes who  suggested reducing metoprolol succinate 25-1/2 tablet daily. Pulse rate has increased from the high 40s up into the 50 range. Recently between 55-64          1/24-off metoprolol pulse in the low 60 range     hypertension/chronic kidney disease-he will continue medications and nephrology follow-up with Dr. Sahu's successor, Dr. Contreras. He'll see her semiannually.             They will follow-up with Dr. Gutierrez semiannually             January 2024-blood pressure and pulse remain improved/stable off low-dose metoprolol.  Labs have been done for the visit with the nephrologist next week             8/24-blood pressure improved              10/24-blood pressure slightly elevated.  Normal creatinine 0.76        Parkinson's - not problematic, without noticeable tremor with medications per Dr. Peters in neurology- just saw last month; cont. semiannual visits. Overall doing well from this standpoint. To see Dr. Peters semiannually. Tremor not obvious today and exam. Doing well presently. semiannual visits planned. Tolerating medications. No recent concerns stable course.   He will see Dr. Lyle annually -each October. Clinically stable    Multiple medications-patient is on multiple medications. Medication list reviewed with the patient and reconciled and updated in the EMR accordingly. Ongoing efforts to minimize the number of medications medications as well as optimize dosing and compliance continues to be an ongoing priority.          5/24-medication list reviewed       Remote intracranial bleed/right-sided hemiparesis/gait unsteadiness - stable course. He's functionally independent for the most part at home. His wife continues to be extremely supportive accordingly. He remained stable fortunately.  His arms have normal strength, as does his left leg.  His right leg remains partially paralyzed                He will continue caution ambulating and continue exercises and stretches to maintain strength stamina as well as  ambulation conditioning. He uses a walker at home typically fortunately no recent falls. Physical therapy continues on an as-needed basis     Hypothyroidism - we'll continue to follow thyroid labs semi-annually   Sept '22 TSH normal, continue present regimen.           4/24-TSH stable              9/24-TSH ordered     Prediabetes /elevated weight- monitoring on semiannual basis   Jan '22, A1c 6.1%   Sept '22, A1c 6.1%, stable from prior, discussed weight loss efforts today.        4/29/2024-A1c 5.6%, improved from 5.9%.  BMI 29.6          9/24-lipids ordered     Dyslipidemia/elevated weight -goal LDL under 100   Sept 2022 lipids slightly elevated but overall stable. Health lifestyle measures encouraged. He will continue to follow-up with cardiology (Dr. Foley) accordingly.               9/24-lipids ordered    Postnasal drip-he will continue the Flonase and restart Zyrtec     History of Recurrent pneumonia/with hospitalization again with another rehabilitation stay-    Improved presently no shortness of breath. Occas cough. Using incentive spirometer a 3 times daily     Anemia-iron deficiency concern-             Late April 2024-H&H 12.6 39.6-he will call his gastroenterologist to see if an EGD/colonoscopy is needed sooner than scheduled            H&H normal earlier today 10/25/2024    History of acute renal failure-improved in hospital with hydration-creatinine 0.87 4/29/2024.  He follows with Ai Gutierrez in nephrology, in 3/24, and 5,24               Will continue to follow labs     Urinary incontinence/BPH/nephrolithiasis/hypogonadism will continue medications per urology - He follows with Dr. Srivastava in urology--              No recent concerns    Vitamin D deficiency - encouraged patient to continue vitamin D daily as ordered. Will consider vitamin D level regularly.   Sept '22 vitamin D stable/normal.    H. Pylori - 6/24 - s/p treatment, and follow up breath test OK     Colon care/colon polyps -  He had 2  tubular adenomatous polyps on his 6/24 colonoscopy.            Next in 3 years-Jun '27        Skin care/scalp psoriasis/eczema - per Dr. Amaya annually or as needed. UTD.      Dental care / upper partial - continue every 6 months      Elevated IOP / Vision care/bilateral cataract/glasses -  visits continue. He will see Dr. Riley August 2021 to address his bilateral cataracts.           At this point the cataracts are not ready to be removed. They will continue to see her regularly              S/p right laser procedure, left laser eye  in Aug '23 for pressure concerns.            Eye visits UTD semiannually w/ Dr. Riley. Bilat cataracts                   Flu shot - each fall after Labor Day encouraged-          Updated 9/13/2024    COVID booster-11/23            Encouraged considering getting the new COVID booster this fall when available.      Tdap booster-will be needed this spring          5/24- ordered            8/24-encouraged    RSV vaccination-9/24    Shingrix series-completed 2018    Follow-up in 4 months, sooner as needed-             Charting was completed using voice recognition technology and may include unintended errors.      hydration-creatinine 0.87 4/29/2024.  He follows with Ai Gutierrez in nephrology, in 3/24, and 5,24               Will continue to follow labs     Urinary incontinence/BPH/nephrolithiasis/hypogonadism will continue medications per urology - He follows with Dr. Srivastava in urology--              No recent concerns    Vitamin D deficiency - encouraged patient to continue vitamin D daily as ordered. Will consider vitamin D level regularly.   Sept '22 vitamin D stable/normal.    H. Pylori - 6/24 - s/p treatment, and follow up breath test OK     Colon care/colon polyps -  He had 2 tubular adenomatous polyps on his 6/24 colonoscopy.            Next in 3 years-Jun '27        Skin care/scalp psoriasis/eczema - per Dr. Amaya annually or as needed. UTD.      Dental care / upper partial - continue every 6 months      Elevated IOP / Vision care/bilateral cataract/glasses -  visits continue. He will see Dr. Riley August 2021 to address his bilateral cataracts.           At this point the cataracts are not ready to be removed. They will continue to see her regularly              S/p right laser procedure, left laser eye  in Aug '23 for pressure concerns.            Eye visits UTD semiannually w/ Dr. Riley. Bilat cataracts                   Flu shot - each fall after Labor Day encouraged-          Updated 9/13/2024    COVID booster-11/23            Encouraged considering getting the new COVID booster this fall when available.      Tdap booster-will be needed this spring          5/24- ordered            8/24-encouraged    RSV vaccination-9/24    Shingrix series-completed 2018    Follow-up in 4 months, sooner as needed-             Charting was completed using voice recognition technology and may include unintended errors.

## 2025-04-29 ENCOUNTER — HOME HEALTH ADMISSION (OUTPATIENT)
Dept: HOME HEALTH SERVICES | Facility: HOME HEALTH | Age: 70
End: 2025-04-29
Payer: MEDICARE

## 2025-04-29 ENCOUNTER — TELEPHONE (OUTPATIENT)
Dept: HOME HEALTH SERVICES | Facility: HOME HEALTH | Age: 70
End: 2025-04-29
Payer: MEDICARE

## 2025-04-29 ENCOUNTER — PATIENT OUTREACH (OUTPATIENT)
Dept: PRIMARY CARE | Facility: CLINIC | Age: 70
End: 2025-04-29
Payer: MEDICARE

## 2025-04-29 DIAGNOSIS — I48.0 PAROXYSMAL ATRIAL FIBRILLATION (MULTI): ICD-10-CM

## 2025-04-29 DIAGNOSIS — R09.82 POST-NASAL DRIP: Primary | ICD-10-CM

## 2025-04-29 DIAGNOSIS — I10 ESSENTIAL HYPERTENSION WITH GOAL BLOOD PRESSURE LESS THAN 140/90: ICD-10-CM

## 2025-04-29 RX ORDER — FLUTICASONE PROPIONATE 50 MCG
SPRAY, SUSPENSION (ML) NASAL
Qty: 48 ML | Refills: 3 | Status: SHIPPED | OUTPATIENT
Start: 2025-04-29

## 2025-04-29 RX ORDER — AMLODIPINE BESYLATE 5 MG/1
5 TABLET ORAL DAILY
Qty: 90 TABLET | Refills: 3 | Status: SHIPPED | OUTPATIENT
Start: 2025-04-29 | End: 2026-04-24

## 2025-04-29 NOTE — PROGRESS NOTES
I received a call from the patient's wife Berta.  Per Berta, Jabari saw Dr Srivastava from Urology yesterday.  Jabari's UA in the office was negative for infection.  Dr Srivastava put Jabari on Macrobid 100 mg daily for 30 days, then follow up with Dr Srivastava by phone.  Berta in concerned about giving Jabari the antibiotic because he does not have an infection at this time.  She feels Jabari takes too many pills and does not want to add 1 more.  Please advise.

## 2025-04-29 NOTE — TELEPHONE ENCOUNTER
Patient would like his 90 day rx to be sent to [facility]. He only wanted the 30 day supply to go to [facility] in [place].

## 2025-04-29 NOTE — TELEPHONE ENCOUNTER
Mark Rivera,       We apologize for the inconvenience regarding the referral and F2F visit discrepancy. Medicare has become stricter in regard to symptom codes used to order Home Health Services and clarification is needed in order to process the referral. Per the visit on 04/25/2025, the focus appears to be near syncopal episode and diarrhea. Per the referral , the ordering diagnoses are hemiplegia, hemiparesis following cerebral infarction affecting right dominant side, and ataxic gait.       Clarification of the following will need to be addressed on the 04/25/25  visit:  please update/ address documentation on the hemiparesis following cerebral infarction affecting right dominant side         Thank you, and again we apologize for the inconvenience          Intake

## 2025-05-02 ENCOUNTER — DOCUMENTATION (OUTPATIENT)
Dept: HOME HEALTH SERVICES | Facility: HOME HEALTH | Age: 70
End: 2025-05-02
Payer: MEDICARE

## 2025-05-02 NOTE — HH CARE COORDINATION
Home Care received a Referral for Physical Therapy. We have processed the referral for a Start of Care on 05/03/2025.     If you have any questions or concerns, please feel free to contact us at [phone]. Follow the prompts, enter your five digit zip code, and you will be directed to your care team on [facility].

## 2025-05-03 ENCOUNTER — HOME CARE VISIT (OUTPATIENT)
Dept: HOME HEALTH SERVICES | Facility: HOME HEALTH | Age: 70
End: 2025-05-03
Payer: MEDICARE

## 2025-05-05 ENCOUNTER — PATIENT OUTREACH (OUTPATIENT)
Dept: PRIMARY CARE | Facility: CLINIC | Age: 70
End: 2025-05-05
Payer: MEDICARE

## 2025-05-05 DIAGNOSIS — I10 ESSENTIAL HYPERTENSION WITH GOAL BLOOD PRESSURE LESS THAN 140/90: ICD-10-CM

## 2025-05-05 DIAGNOSIS — I48.0 PAROXYSMAL ATRIAL FIBRILLATION (MULTI): ICD-10-CM

## 2025-05-05 NOTE — PROGRESS NOTES
I called and spoke with the patient's wife Berta.  Per Berta, Jabari has had 3 good days.  He has had normal BM's.  Jabari is eating 2-3 prunes a day.   He has an appointment today with GI for hospital discharge follow up.   Berta has been monitoring Jabari's BP since discharge.  This week they were: 144/79, 150/77, 159/92, and 155/80.  His heart rate has been in the 50's.  We discussed Jabari's medications.  Berta will continue to monitor and call me later in the week with the readings.  Instructed to call with any questions or concerns.

## 2025-05-06 ENCOUNTER — APPOINTMENT (OUTPATIENT)
Dept: PRIMARY CARE | Facility: CLINIC | Age: 70
End: 2025-05-06
Payer: MEDICARE

## 2025-05-12 ENCOUNTER — HOME CARE VISIT (OUTPATIENT)
Dept: HOME HEALTH SERVICES | Facility: HOME HEALTH | Age: 70
End: 2025-05-12

## 2025-06-04 ENCOUNTER — PATIENT OUTREACH (OUTPATIENT)
Dept: PRIMARY CARE | Facility: CLINIC | Age: 70
End: 2025-06-04
Payer: MEDICARE

## 2025-06-04 DIAGNOSIS — I10 ESSENTIAL HYPERTENSION WITH GOAL BLOOD PRESSURE LESS THAN 140/90: ICD-10-CM

## 2025-06-04 DIAGNOSIS — I48.0 PAROXYSMAL ATRIAL FIBRILLATION (MULTI): ICD-10-CM

## 2025-06-04 NOTE — PROGRESS NOTES
Care Management Monthly Outreach  Chart review completed  Confirmation of at least 2 patient identifiers  Change in insurance? No    Has patient been to ER/Urgent Care since last outreach? No    Last Office Visit with PCP: 4/25/2025   Next Office Visit with PCP: 6/20/2025   APC Collaboration: n/a    Chronic Conditions and Outreach Summary:   Essential hypertension with goal blood pressure less than 140/90    Paroxysmal atrial fibrillation (Multi)    Medications:   Are there medication changes since last visit? No  Refills needed? No    Social Drivers of Health: Addressed today  Care Gaps Addressed? Addressed today  Care Plan addressed: Yes    Upcoming Appointments:   Future Appointments       Date / Time Provider Department Dept Phone    6/20/2025 2:30 PM (Arrive by 2:15 PM) Everett Rivera MD [facility] Internal Medicine [phone]          Blood Pressures Reviewed  BP Readings from Last 3 Encounters:   04/25/25 132/90   03/21/25 146/84   01/17/25 148/86     Labs Reviewed:  Lab Results   Component Value Date    CREATININE 0.84 11/11/2024    GLUCOSE 112 (H) 11/11/2024    ALKPHOS 67 03/21/2025    K 3.9 11/11/2024    PROT 6.3 03/21/2025     11/11/2024    CALCIUM 9.1 11/11/2024    AST 11 03/21/2025    ALT 6 (L) 03/21/2025    BUN 13 11/11/2024    MG 2.02 11/11/2024    PHOS 3.5 11/11/2024    GFRMALE >90 03/31/2023     Lab Results   Component Value Date    TRIG 105 09/24/2022    CHOL 158 09/24/2022    HDL 35.0 (A) 09/24/2022     Lab Results   Component Value Date    HGBA1C 5.6 04/29/2024    HGBA1C 5.9 (H) 01/05/2024    HGBA1C 6.1 (A) 09/24/2022     MAR updated.  /76-71  No problems per wife.  Agreeable to continue monthly outreaches.  Encouraged to call if questions or concerns arise.    Roseanne Kong RN

## 2025-06-17 ENCOUNTER — TELEPHONE (OUTPATIENT)
Dept: PRIMARY CARE | Facility: CLINIC | Age: 70
End: 2025-06-17

## 2025-06-17 ENCOUNTER — OFFICE VISIT (OUTPATIENT)
Dept: PRIMARY CARE | Facility: CLINIC | Age: 70
End: 2025-06-17
Payer: MEDICARE

## 2025-06-17 VITALS
HEART RATE: 67 BPM | BODY MASS INDEX: 26.39 KG/M2 | OXYGEN SATURATION: 95 % | HEIGHT: 72 IN | SYSTOLIC BLOOD PRESSURE: 145 MMHG | DIASTOLIC BLOOD PRESSURE: 71 MMHG

## 2025-06-17 DIAGNOSIS — L03.115 CELLULITIS OF LEG, RIGHT: ICD-10-CM

## 2025-06-17 DIAGNOSIS — E78.5 DYSLIPIDEMIA: ICD-10-CM

## 2025-06-17 DIAGNOSIS — R73.03 PREDIABETES: ICD-10-CM

## 2025-06-17 DIAGNOSIS — I10 ESSENTIAL HYPERTENSION WITH GOAL BLOOD PRESSURE LESS THAN 140/90: Primary | ICD-10-CM

## 2025-06-17 DIAGNOSIS — I87.2 VENOUS STASIS DERMATITIS OF BOTH LOWER EXTREMITIES: ICD-10-CM

## 2025-06-17 DIAGNOSIS — R35.1 BENIGN PROSTATIC HYPERPLASIA WITH NOCTURIA: ICD-10-CM

## 2025-06-17 DIAGNOSIS — L85.3 DRY SKIN DERMATITIS: ICD-10-CM

## 2025-06-17 DIAGNOSIS — I11.0 HYPERTENSIVE HEART DISEASE WITH HEART FAILURE: ICD-10-CM

## 2025-06-17 DIAGNOSIS — N40.1 BENIGN PROSTATIC HYPERPLASIA WITH NOCTURIA: ICD-10-CM

## 2025-06-17 PROCEDURE — 99213 OFFICE O/P EST LOW 20 MIN: CPT | Performed by: INTERNAL MEDICINE

## 2025-06-17 PROCEDURE — 3078F DIAST BP <80 MM HG: CPT | Performed by: INTERNAL MEDICINE

## 2025-06-17 PROCEDURE — 3077F SYST BP >= 140 MM HG: CPT | Performed by: INTERNAL MEDICINE

## 2025-06-17 PROCEDURE — 1160F RVW MEDS BY RX/DR IN RCRD: CPT | Performed by: INTERNAL MEDICINE

## 2025-06-17 PROCEDURE — 1159F MED LIST DOCD IN RCRD: CPT | Performed by: INTERNAL MEDICINE

## 2025-06-17 RX ORDER — TAMSULOSIN HYDROCHLORIDE 0.4 MG/1
0.4 CAPSULE ORAL DAILY
Qty: 90 CAPSULE | Refills: 3 | Status: SHIPPED | OUTPATIENT
Start: 2025-06-17

## 2025-06-17 RX ORDER — PRAVASTATIN SODIUM 20 MG/1
20 TABLET ORAL DAILY
Qty: 90 TABLET | Refills: 3 | Status: SHIPPED | OUTPATIENT
Start: 2025-06-17

## 2025-06-17 RX ORDER — CEPHALEXIN 500 MG/1
500 CAPSULE ORAL 4 TIMES DAILY
Qty: 40 CAPSULE | Refills: 0 | Status: SHIPPED | OUTPATIENT
Start: 2025-06-17 | End: 2025-06-27

## 2025-06-17 RX ORDER — LOSARTAN POTASSIUM 100 MG/1
50 TABLET ORAL DAILY
Status: SHIPPED | COMMUNITY
Start: 2025-06-17

## 2025-06-17 NOTE — PROGRESS NOTES
Patient's wife Berta called and stated that Jabari's leg is warm and has some scratches.  The [facility] is not able to see him for 1 week.  Berta requested an appointment for today with Dr Rivera.  Appointment made for 2:30PM today.

## 2025-06-17 NOTE — PROGRESS NOTES
Subjective   Patient ID: Jabair Swan is a 70 y.o. male who presents for Wound Check (Wound right leg).    Here with his wife concerned about right leg scratches that she noticed this morning when he woke up.  He is otherwise felt well         Review of Systems    Objective   /71 (BP Location: Right arm, Patient Position: Sitting, BP Cuff Size: Large adult)   Pulse 67   Ht 1.829 m (6')   SpO2 95%   BMI 26.39 kg/m²     Physical Exam  Constitutional:       Comments: Well-appearing male in a wheelchair no distress, clinically at his baseline  Pulmonary exam revealed clear breath sounds bilaterally in all lung fields. No wheezes bronchitis or rales noted.  Cardiac exam revealed regular rate and rhythm without murmurs gallops or rubs.  Extremities without pitting edema  Right leg without his sock on-with transmetatarsal amputations baseline and extensive stasis dermatitis pigmentation changes.  He had some linear excoriations posteriorly right mid calf area-without any drainage-approximately 2 to 3 cm long         Assessment/Plan   Problem List Items Addressed This Visit           ICD-10-CM    Benign enlargement of prostate N40.0    Relevant Medications    tamsulosin (Flomax) 0.4 mg 24 hr capsule    Dyslipidemia E78.5    Relevant Medications    pravastatin (Pravachol) 20 mg tablet    Essential hypertension with goal blood pressure less than 140/90 - Primary I10    Relevant Medications    losartan (Cozaar) 100 mg tablet    Prediabetes R73.03    Venous stasis dermatitis of both lower extremities I87.2    Dry skin dermatitis L85.3     Other Visit Diagnoses         Codes      Cellulitis of leg, right     L03.115    Relevant Medications    cephalexin (Keflex) 500 mg capsule             Right leg excoriations-he woke up with these this morning suggesting that he inadvertently scratched his right leg with his left foot.  At this point I have recommended oral Keflex and topical gentamicin, the wound was dressed with  gentamicin and nonstick dressing placed.  They are scheduled to see the wound clinic in 72 hours.  If things worsen they will go to the ER.  Importantly I told him he cannot just sleep in his underwear any longer-he needs to sleep with long pants on to prevent this kind of scratching overnight    Hypertension plan-they will continue the medication including amlodipine 10 mg at 9 AM and losartan 50 mg at 10 PM    History of complex renal cyst-imaged in November-they will continue to follow with her specialist, Dr. Gutierrez    Follow-up as scheduled sooner with concerns

## 2025-06-19 DIAGNOSIS — I10 ESSENTIAL HYPERTENSION WITH GOAL BLOOD PRESSURE LESS THAN 140/90: ICD-10-CM

## 2025-06-19 RX ORDER — AMLODIPINE BESYLATE 10 MG/1
10 TABLET ORAL DAILY
Qty: 90 TABLET | Refills: 3 | Status: SHIPPED | OUTPATIENT
Start: 2025-06-19

## 2025-06-20 ENCOUNTER — APPOINTMENT (OUTPATIENT)
Dept: PRIMARY CARE | Facility: CLINIC | Age: 70
End: 2025-06-20
Payer: MEDICARE

## 2025-06-20 ENCOUNTER — OFFICE VISIT (OUTPATIENT)
Dept: WOUND CARE | Facility: CLINIC | Age: 70
End: 2025-06-20
Payer: MEDICARE

## 2025-06-20 PROBLEM — I11.0 HYPERTENSIVE HEART DISEASE WITH HEART FAILURE: Status: ACTIVE | Noted: 2025-06-20

## 2025-06-20 PROCEDURE — 99212 OFFICE O/P EST SF 10 MIN: CPT

## 2025-06-24 ENCOUNTER — APPOINTMENT (OUTPATIENT)
Dept: WOUND CARE | Facility: CLINIC | Age: 70
End: 2025-06-24
Payer: MEDICARE

## 2025-06-26 ENCOUNTER — OFFICE VISIT (OUTPATIENT)
Dept: WOUND CARE | Facility: CLINIC | Age: 70
End: 2025-06-26
Payer: MEDICARE

## 2025-06-26 PROCEDURE — 99212 OFFICE O/P EST SF 10 MIN: CPT

## 2025-06-26 PROCEDURE — 99212 OFFICE O/P EST SF 10 MIN: CPT | Performed by: PLASTIC SURGERY

## 2025-07-07 ENCOUNTER — TELEPHONE (OUTPATIENT)
Dept: PRIMARY CARE | Facility: CLINIC | Age: 70
End: 2025-07-07
Payer: MEDICARE

## 2025-07-07 ENCOUNTER — PATIENT OUTREACH (OUTPATIENT)
Dept: PRIMARY CARE | Facility: CLINIC | Age: 70
End: 2025-07-07
Payer: MEDICARE

## 2025-07-07 DIAGNOSIS — I10 ESSENTIAL HYPERTENSION WITH GOAL BLOOD PRESSURE LESS THAN 140/90: ICD-10-CM

## 2025-07-07 DIAGNOSIS — I48.0 PAROXYSMAL ATRIAL FIBRILLATION (MULTI): ICD-10-CM

## 2025-07-07 RX ORDER — LOSARTAN POTASSIUM 100 MG/1
50 TABLET ORAL DAILY
Qty: 15 TABLET | Refills: 0 | Status: CANCELLED | OUTPATIENT
Start: 2025-07-07

## 2025-07-07 RX ORDER — LOSARTAN POTASSIUM 50 MG/1
50 TABLET ORAL DAILY
Qty: 90 TABLET | Refills: 3 | Status: SHIPPED | OUTPATIENT
Start: 2025-07-07 | End: 2025-07-07 | Stop reason: SDUPTHER

## 2025-07-07 RX ORDER — LOSARTAN POTASSIUM 50 MG/1
50 TABLET ORAL DAILY
Qty: 30 TABLET | Refills: 3 | Status: SHIPPED | OUTPATIENT
Start: 2025-07-07

## 2025-07-07 NOTE — PROGRESS NOTES
Care Management Monthly Outreach  Chart review completed  Confirmation of at least 2 patient identifiers  Change in insurance? No    Has patient been to ER/Urgent Care since last outreach? No    Last Office Visit with PCP: 6/17/2025   Next Office Visit with PCP: Visit date not found   APC Collaboration: n/a    Chronic Conditions and Outreach Summary:    Essential hypertension with goal blood pressure less than 140/90    Paroxysmal atrial fibrillation (Multi)      Medications:   Are there medication changes since last visit? No  Refills needed? Yes -Losartan 50 mg    Social Drivers of Health: Addressed today  Care Gaps Addressed? Addressed today  Care Plan addressed: Yes    Upcoming Appointments:     Blood Pressures Reviewed  BP Readings from Last 3 Encounters:   06/17/25 145/71   04/25/25 132/90   03/21/25 146/84     Labs Reviewed:  Lab Results   Component Value Date    CREATININE 0.84 11/11/2024    GLUCOSE 112 (H) 11/11/2024    ALKPHOS 67 03/21/2025    K 3.9 11/11/2024    PROT 6.3 03/21/2025     11/11/2024    CALCIUM 9.1 11/11/2024    AST 11 03/21/2025    ALT 6 (L) 03/21/2025    BUN 13 11/11/2024    MG 2.02 11/11/2024    PHOS 3.5 11/11/2024    GFRMALE >90 03/31/2023     Lab Results   Component Value Date    TRIG 105 09/24/2022    CHOL 158 09/24/2022    HDL 35.0 (A) 09/24/2022     Lab Results   Component Value Date    HGBA1C 5.6 04/29/2024    HGBA1C 5.9 (H) 01/05/2024    HGBA1C 6.1 (A) 09/24/2022     Lab Results   Component Value Date    WBC 5.1 11/11/2024    RBC 4.40 (L) 11/11/2024    HGB 13.3 (L) 11/11/2024     11/11/2024   No other concerns at this time.  Agreeable to continue monthly outreaches.  Encouraged to call if questions or concerns arise.    Roseanne Kong RN

## 2025-07-07 NOTE — TELEPHONE ENCOUNTER
Berta called upset because she said she had requested a new prescription for Losartan 50 mg, not 100 mg. She would like a 30 day prescription to go [facility] and a 90 day prescription to go to [facility].

## 2025-07-08 NOTE — TELEPHONE ENCOUNTER
Left message to let patient and spouse know that prescription was sent in to [facility] locally and another prescription will be sent to [facility] for future use.

## 2025-07-09 ENCOUNTER — TELEPHONE (OUTPATIENT)
Dept: PRIMARY CARE | Facility: CLINIC | Age: 70
End: 2025-07-09
Payer: MEDICARE

## 2025-07-09 NOTE — TELEPHONE ENCOUNTER
Called in prescription for Losartan 50 mg tablets to [facility]. Patients spouse called asking again where the prescription was. It was canceled because short supply was sent to local pharmacy. This should be cleared up now, and will be sent out in approximately 15 days when patient is eligible.

## 2025-07-18 ENCOUNTER — APPOINTMENT (OUTPATIENT)
Dept: PRIMARY CARE | Facility: CLINIC | Age: 70
End: 2025-07-18
Payer: MEDICARE

## 2025-07-21 ENCOUNTER — PATIENT OUTREACH (OUTPATIENT)
Dept: PRIMARY CARE | Facility: CLINIC | Age: 70
End: 2025-07-21
Payer: MEDICARE

## 2025-07-21 DIAGNOSIS — L03.115 CELLULITIS OF LEG, RIGHT: ICD-10-CM

## 2025-07-21 DIAGNOSIS — Z00.00 ENCOUNTER FOR GENERAL ADULT MEDICAL EXAMINATION WITHOUT ABNORMAL FINDINGS: ICD-10-CM

## 2025-07-21 DIAGNOSIS — I10 ESSENTIAL HYPERTENSION WITH GOAL BLOOD PRESSURE LESS THAN 140/90: ICD-10-CM

## 2025-07-21 DIAGNOSIS — G81.91 HEMIPARESIS, RIGHT (MULTI): ICD-10-CM

## 2025-07-21 RX ORDER — FOLIC ACID 1 MG/1
1 TABLET ORAL DAILY
Qty: 90 TABLET | Refills: 3 | Status: SHIPPED | OUTPATIENT
Start: 2025-07-21 | End: 2026-07-21

## 2025-07-21 NOTE — PROGRESS NOTES
Spoke with patients wife Berta.  Per Berta Jabari is to be discharged from [facility] on 7/22/2025.  TCM scheduled for 7/28/2025 at 4 PM.

## 2025-07-22 ENCOUNTER — PATIENT OUTREACH (OUTPATIENT)
Dept: PRIMARY CARE | Facility: CLINIC | Age: 70
End: 2025-07-22
Payer: MEDICARE

## 2025-07-22 ENCOUNTER — DOCUMENTATION (OUTPATIENT)
Dept: PRIMARY CARE | Facility: CLINIC | Age: 70
End: 2025-07-22
Payer: MEDICARE

## 2025-07-22 DIAGNOSIS — I10 ESSENTIAL HYPERTENSION WITH GOAL BLOOD PRESSURE LESS THAN 140/90: ICD-10-CM

## 2025-07-22 DIAGNOSIS — L03.115 CELLULITIS OF LEG, RIGHT: ICD-10-CM

## 2025-07-22 NOTE — PROGRESS NOTES
Discharge facility: [facility]  Discharge diagnosis: Cellulitis  Admission date: 7/18/2025  Discharge date: 7/21/2025    PCP Appointment Date: 7/28/2025  Specialist Appointment Date: na  Hospital Encounter and Summary: Linked    See Discharge assessment below for further details      Medications  Medications reviewed with patient/caregiver?: Yes (7/22/2025  9:18 AM)  Is the patient having any side effects they believe may be caused by any medication additions or changes?: No (7/22/2025  9:18 AM)  Does the patient have all medications ordered at discharge?: Yes (7/22/2025  9:18 AM)  Care Management Interventions: Provided patient education (7/22/2025  9:18 AM)  Prescription Comments: Keflex 500 mg Take 1 capsule by mouth four times daily for 4 days (7/22/2025  9:18 AM)  Is the patient taking all medications as directed (includes completed medication regime)?: Yes (7/22/2025  9:18 AM)  Care Management Interventions: Provided patient education (7/22/2025  9:18 AM)    Appointments  Does the patient have a primary care provider?: Yes (7/22/2025  9:18 AM)  Care Management Interventions: Verified appointment date/time/provider (7/22/2025  9:18 AM)  Has the patient kept scheduled appointments due by today?: Yes (7/22/2025  9:18 AM)  Care Management Interventions: Advised patient to keep appointment (7/22/2025  9:18 AM)    Self Management  What is the home health agency?: NA (7/22/2025  9:18 AM)    Patient Teaching  Does the patient have access to their discharge instructions?: Yes (7/22/2025  9:18 AM)  Care Management Interventions: Reviewed instructions with patient (7/22/2025  9:18 AM)  What is the patient's perception of their health status since discharge?: Improving (7/22/2025  9:18 AM)  Is the patient/caregiver able to teach back the hierarchy of who to call/visit for symptoms/problems? PCP, Specialist, Home Health nurse, Urgent Care, ED, 911: Yes (7/22/2025  9:18 AM)  Patient/Caregiver Education Comments:  Spoke with the patient's wife, Berta.  Per Sara, Jabari's leg is looking better.  Patient is taking the Ancef as directed.  Hospital discharge with Dr Rivera scheduled. (7/22/2025  9:18 AM)     TCM outreach completed on :  7/22/2025  Discharge date: 7/21/2025  Pt has follow up on : 7/28/2025    Moderately complex & follow-up within 14 days- bill [id number] for hospital follow up.   Highly complex and follow-up visit within 7 days-can bill [id number] for hospital follow up    *virtual follow up needs modifier added (95 or GT)   *AWV AND TCM CAN BE BILLED TOGETHER WITH 25 MODIFIER

## 2025-07-22 NOTE — PROGRESS NOTES
Documentation  7/22/2025  [facility] Internal Medicine           Progress Notes  Roseanne Kong, RN (Coordinator)  Primary Care  Discharge facility: [facility]  Discharge diagnosis: Cellulitis  Admission date: 7/18/2025  Discharge date: 7/21/2025     PCP Appointment Date: 7/28/2025  Specialist Appointment Date: na  Hospital Encounter and Summary: Linked     See Discharge assessment below for further details       Medications  Medications reviewed with patient/caregiver?: Yes (7/22/2025  9:18 AM)  Is the patient having any side effects they believe may be caused by any medication additions or changes?: No (7/22/2025  9:18 AM)  Does the patient have all medications ordered at discharge?: Yes (7/22/2025  9:18 AM)  Care Management Interventions: Provided patient education (7/22/2025  9:18 AM)  Prescription Comments: Keflex 500 mg Take 1 capsule by mouth four times daily for 4 days (7/22/2025  9:18 AM)  Is the patient taking all medications as directed (includes completed medication regime)?: Yes (7/22/2025  9:18 AM)  Care Management Interventions: Provided patient education (7/22/2025  9:18 AM)     Appointments  Does the patient have a primary care provider?: Yes (7/22/2025  9:18 AM)  Care Management Interventions: Verified appointment date/time/provider (7/22/2025  9:18 AM)  Has the patient kept scheduled appointments due by today?: Yes (7/22/2025  9:18 AM)  Care Management Interventions: Advised patient to keep appointment (7/22/2025  9:18 AM)     Self Management  What is the home health agency?: NA (7/22/2025  9:18 AM)     Patient Teaching  Does the patient have access to their discharge instructions?: Yes (7/22/2025  9:18 AM)  Care Management Interventions: Reviewed instructions with patient (7/22/2025  9:18 AM)  What is the patient's perception of their health status since discharge?: Improving (7/22/2025  9:18 AM)  Is the patient/caregiver able to teach back the hierarchy of who to call/visit for  symptoms/problems? PCP, Specialist, Home Health nurse, Urgent Care, ED, 911: Yes (7/22/2025  9:18 AM)  Patient/Caregiver Education Comments: Spoke with the patient's wife, Berta.  Per Sara, Jabari's leg is looking better.  Patient is taking the Ancef as directed.  Hospital discharge with Dr Rivera scheduled. (7/22/2025  9:18 AM)      TCM outreach completed on :  7/22/2025  Discharge date: 7/21/2025  Pt has follow up on : 7/28/2025     Moderately complex & follow-up within 14 days- bill [id number] for hospital follow up.   Highly complex and follow-up visit within 7 days-can bill [id number] for hospital follow up     *virtual follow up needs modifier added (95 or GT)   *AWV AND TCM CAN BE BILLED TOGETHER WITH 25 MODIFIER       Electronically signed by Roseanne Kong RN at 7/22/2025  9:32 AM

## 2025-07-24 DIAGNOSIS — I73.9 PVD (PERIPHERAL VASCULAR DISEASE): ICD-10-CM

## 2025-07-24 DIAGNOSIS — R60.9 EDEMA, UNSPECIFIED TYPE: ICD-10-CM

## 2025-07-24 RX ORDER — FUROSEMIDE 20 MG/1
20 TABLET ORAL 2 TIMES DAILY
Qty: 180 TABLET | Refills: 3 | Status: SHIPPED | OUTPATIENT
Start: 2025-07-24

## 2025-07-28 ENCOUNTER — APPOINTMENT (OUTPATIENT)
Dept: PRIMARY CARE | Facility: CLINIC | Age: 70
End: 2025-07-28
Payer: MEDICARE

## 2025-07-28 VITALS — OXYGEN SATURATION: 97 % | SYSTOLIC BLOOD PRESSURE: 128 MMHG | DIASTOLIC BLOOD PRESSURE: 74 MMHG | HEART RATE: 48 BPM

## 2025-07-28 DIAGNOSIS — I89.0 LYMPHEDEMA: ICD-10-CM

## 2025-07-28 DIAGNOSIS — I73.9 PVD (PERIPHERAL VASCULAR DISEASE): Primary | ICD-10-CM

## 2025-07-28 DIAGNOSIS — E66.3 OVERWEIGHT: ICD-10-CM

## 2025-07-28 DIAGNOSIS — R73.03 PREDIABETES: ICD-10-CM

## 2025-07-28 DIAGNOSIS — I48.0 PAROXYSMAL ATRIAL FIBRILLATION (MULTI): ICD-10-CM

## 2025-07-28 DIAGNOSIS — I10 ESSENTIAL HYPERTENSION WITH GOAL BLOOD PRESSURE LESS THAN 140/90: ICD-10-CM

## 2025-07-28 DIAGNOSIS — I11.0 HYPERTENSIVE HEART DISEASE WITH HEART FAILURE: ICD-10-CM

## 2025-07-28 DIAGNOSIS — Z79.01 CHRONIC ANTICOAGULATION: ICD-10-CM

## 2025-07-28 DIAGNOSIS — L85.3 DRY SKIN DERMATITIS: ICD-10-CM

## 2025-07-28 DIAGNOSIS — Z99.89 WALKER AS AMBULATION AID: ICD-10-CM

## 2025-07-28 DIAGNOSIS — I87.2 VENOUS INSUFFICIENCY (CHRONIC) (PERIPHERAL): ICD-10-CM

## 2025-07-28 DIAGNOSIS — I87.2 VENOUS STASIS DERMATITIS OF BOTH LOWER EXTREMITIES: ICD-10-CM

## 2025-07-28 DIAGNOSIS — E55.9 VITAMIN D DEFICIENCY: ICD-10-CM

## 2025-07-28 DIAGNOSIS — Z89.431 STATUS POST AMPUTATION OF RIGHT FOOT THROUGH METATARSAL BONE (MULTI): ICD-10-CM

## 2025-07-28 DIAGNOSIS — Z74.2 ASSISTANCE NEEDED AT HOME: ICD-10-CM

## 2025-07-28 PROCEDURE — 99495 TRANSJ CARE MGMT MOD F2F 14D: CPT | Performed by: INTERNAL MEDICINE

## 2025-07-28 PROCEDURE — 3078F DIAST BP <80 MM HG: CPT | Performed by: INTERNAL MEDICINE

## 2025-07-28 PROCEDURE — 3074F SYST BP LT 130 MM HG: CPT | Performed by: INTERNAL MEDICINE

## 2025-07-28 PROCEDURE — 1159F MED LIST DOCD IN RCRD: CPT | Performed by: INTERNAL MEDICINE

## 2025-07-28 PROCEDURE — 1160F RVW MEDS BY RX/DR IN RCRD: CPT | Performed by: INTERNAL MEDICINE

## 2025-07-28 NOTE — PROGRESS NOTES
"Patient: Jabari Swan  : 1955  PCP: Everett Rivera MD  MRN: [id number]     Jabari Swan is a 70 y.o. male presenting today for follow-up after being discharged from the hospital 7 days ago. The main problem requiring admission was cellulitis. The discharge summary and/or Transitional Care Management documentation was reviewed. Medication reconciliation was performed as indicated via the \"Edgardo as Reviewed\" timestamp.     Jabari Swan was contacted by Transitional Care Management services two days after his discharge. This encounter and supporting documentation was reviewed.    The complexity of medical decision making for this patient's transitional care is moderate.    Review of Systems    Family History[1]    No data recorded    Here with his wife after admission last week-after developing worsening leg throbbing on the day of admission-2025.  He went to the hospital and was evaluated in the ER and admitted for IV antibiotics with Keflex.  Since discharge home he has been on oral Keflex-clinically improved no evidence of DVT      On exam he appeared comfortable in his wheelchair no distress at baseline  Eye exam revealed pupils equally round and reactive, with extraocular muscles intact. Normal sclera and eyelids.  Neck exam revealed no masses, adenopathy or thyromegaly. No neck pain on range of motion was noted.  Pulmonary exam revealed clear breath sounds bilaterally in all lung fields. No wheezes bronchitis or rales noted.  Cardiac exam revealed I/VI systolic ejection murmur, without gallops or rubs.  No back flank or abdominal discomfort  Examination of his right leg without socks on-chronic stasis changing with stasis scarring anteriorly-with the parameter erythematous rim around this without obvious cellulitis issues.  Transmetatarsal amputation distally-unremarkable foot exam at baseline       Portions of this encounter note have been copied from my previous note dated 25  , which have been " updated where appropriate and all reflect my current medical decision making from today.         Home situation-he lives with his wife.  He has been debilitated since his CVA years ago.  He does not drive.  They live in a split-level house-so he is living on 3 levels.  He has a walker on each level.  He has banisters up and down both sets of stairs.  He does not need any help in the bathroom-they have a shower seat.           Although his wife does all the shopping and most of the housework, Jabari can vacuum, cooks and does laundry    Care planning-his wife is extraordinarily helpful-she was here today during the visit.  She will continue her remarkable efforts and contact me by phone or [employer] messaging with any additional concerns.             We spoke at length today regarding home issues including his medication, and other home concerns accordingly.           7/25-his wife continues to do a remarkable job at providing his care planning accordingly, arranging his medications diet doing the shopping all of the house chores practically along with coordinating his health care involving multiple medications and doctor visits.  It has been a remarkable effort      Admission note reviewed  Discharge note reviewed  Some of the admission labs were reviewed between 7/18/2025-7/21/2025  Venous duplex reviewed as well as chest x-ray from admission-7/18/2025        Patient was recently discharged from an inpatient facility. Discharge medication list reviewed and reconciled with her electronic medical record medication list. Discharge orders reviewed, and we'll continue to follow active medical problems as outlined in this note.    S/p hospitalization-7/18/2025-7/21/2025-with right leg cellulitis-improved with IV Keflex    Right leg cellulitis-as above treated inpatient with IV Keflex during his late July 2025 admission-oral Keflex was then used which ended 7/25/2025.  On exam today in the office-no evidence of active  cellulitis.    Bullous pemphigoid issue-he saw his dermatologist, 7/20/2025-Dr. Villalobos who has done a biopsy and suspects possible bullous pemphigoid.  They will follow-up with the biopsy as scheduled 8/6/2025 to review the biopsy result and discussed treatment measures      S/p  hospitalization-April 18-April 20, 2025 after vasovagal episode in the bathroom after using Linzess earlier in the morning.  This has happened several other times.  They will discontinue this medication    Bowel movements-encourage consistent fiber fluid intake.  He will remain off Linzess will reduce amlodipine from 10 to 5 mg daily.  He will continue prune juice 6 ounces every day, and MiraLAX to half a scoop daily.  Colace, 100 mg twice daily for stool softener added    UTI with systemic symptoms with worsening fatigue and lethargy-admitted overnight with hydration and IV antibiotics-discharged with Keflex-doing better he will recheck urine studies and accordingly            Follow-up urine analysis April 25 unremarkable.  He is scheduled to see Dr. Srivastava in urology April 28, 2025    Gait unsteadiness-Home health for physical therapy to help regain gait conditioning stamina strength and prevent falls ordered           7/25-he understands need for caution with position changes and continuing to do his strengthening exercises    UTI concerns-          4/17/2025-his follow-up urine grew 2 species but the urine analysis was normal although it had epithelial cells suggestive of contamination.  Review of this situation with infectious disease strongly recommends holding antibiotics in light of the normal urine analysis for now-but a clean-catch straight cath urine analysis/culture specimen is needed-to fully evaluate this further.  Unfortunately with his BPH, this problem has a potential to continue for some time not unlike his recurrent cellulitis issues requiring multiple rounds of antibiotics.  In the context of his multiple antibiotic  allergies as well as the potential to develop resistant urinary species-it is very important for him to determine if/when antibiotics would be needed for a urinary tract infection, especially in light of his recent clinical episode with worsening fatigue and lethargy resulting in ER visit with diagnosis of UTI and clinical improvement with treatment at that accordingly.              Will ask home health nurse to get out to his house to do a straight cath for further evaluation and management of this very complex situation       Elevated lipase-will reassess-presently otherwise no intestinal symptoms    Dehydration-associated with his recent UTI-improved-will continue to push fluids    S/p ER visit 1/3/2025-with diarrhea and dehydration-improved with IV fluid.  Linzess was provided but discontinued.    S/p hospitalization 12/30/2024-12/31/2024-with diarrhea and dehydration-from the recent antibiotic for cellulitis-improved presently    Bilateral calf and left foot erythema-we spoke at length regarding the need to continue to watch this.  He has been on a home skin care plan mainly with Aquaphor moisturizer but also intermittently with triamcinolone cream for the stasis dermatitis and irritation issues from his very tight compression hose.  His wife had used the triamcinolone cream once or twice last week but clearly there is some irritation on the right calf.  Will start with mupirocin twice daily to the areas that are red.  She will call in the next 72 hours with an update on his symptoms we will transition over the next week towards triamcinolone cream twice daily for 7 to 10 days and then return to Aquaphor.  They will continue daily feet exams and podiatry follow-up accordingly            10/24-as above status post ER visit over the weekend with IV antibiotics provided.  He will finish the cephalexin           1/10/2025-at this point the main focus was his right calf-he has a medial port that has chronic stasis  pigmentation changes and a anterior lateral part that appears chronically tan-with stasis pigmentation changes but not as dark.  We discussed her new routine-  Before dinner he will remove his compression hose  After dinner he will do the 60-minute pneumatic compression cycle and then remove the plastic sleeves  On the right calf they will use triamcinolone nightly  On the left calf they will use the Eucerin  On each calf, each morning they will use the Eucerin  He will continue the compression hose during the day            3/25-doing better-taking off his compression hose after dinner and using the Eucerin more often along with the triamcinolone cream.  They will continue the plan as outlined above                      Gait unsteadiness/history of falls-he will use caution with his walker in his split-level house.  They have a lot of handles in the bathrooms and a shower seat along with other adaptive measures to help including banisters up both sides with the 2 sets of stairs.  They have a walker at each level              3/25-fortunately no recent falls             4/25-more unsteadiness-PT ordered    Exercise routine-is really not doing much.  Encouraged chair exercises 5 days weekly with either home video or exercise routine    S/p RSV pneumonia-improved presently.  He will look to get the RSV vaccination later this fall              RSV vaccination updated 9/4/2024    S/p hospitalization with cellulitis 2/10/2024, discharged 2/16/2024 into rehab.  He was discharged from rehab 3 days ago, on Friday, 3/8/2024 with left leg cellulitis requiring cephalexin    Recurrent stasis cellulitis-mupirocin refilled.  His left compression hose is extraordinarily difficult.  We try to get this saw and it was a great struggle.  I strongly encouraged him to work with home supply service care to get zippered compression hose.  He is scheduled to see his dermatologist, Dr. Villalobos 3/20/2024 and his podiatrist this next week               9/24-as above we will continue topical efforts, and podiatry follow-up as well               10/24-he has been to the wound clinic a week ago which provided topical gentamicin then in the ER with worsening right posterior calf redness associated with several linear excoriations-he received IV antibiotics and discharged earlier today from the ER.  Improved clinically.  Clearly his tight compression hose remain an issue-it appears that he needs compression hose that are not as compressive.  His wife will clarify the present strength and will reduce that through his supply store.  He will follow-up with the wound clinic as needed               3/25-fortunately no recent cellulitis issues.  Once again encouraged consistent care as above      Stasis dermatitis longstanding lymphedema/stasis cellulitis concerns -             Improved presently. He is on a stronger compression-he is able to put his socks on himself each morning. Uses triamcinolone cream and CeraVe -as recommended by the dermatologist, Dr. Villalobos who they will see as needed           Vascular surgery reevaluation 7/23 after ER visit.  Ultrasound completed          9 /24-encouraged triamcinolone cream to evenings weekly, at least an hour or so before putting on the vacuum compression device that he sleeps with             10/24-he will continue the present plan as above and use compression hose that are not as difficult to get off so that he does not scrape the skin getting these on or off              3/25-skin care continues mainly focusing on the moisturizer       Longitudinal excoriations-from working to get his compression hose on and off-will change to a less compressive strength compression hose prescription.  Strongly encouraged him to use more caution pulling on and taking off his compression hose the follow-up with concerns    Edema -longstanding-stable, compression hose daily, also uses a pump in the evening, continue Lasix as directed -  this was recently decreased from twice daily by nephrology. Patient encouraged to get a scale so that he can monitor weight at home and keep log.    His wife states that his nephrologist suggested reducing the furosemide a bit-they will take 1 tablet Monday Wednesday Friday, 2 tablets on the other days and start doing weights at home              10/24-as above will reduce the compression on his stockings and will follow this accordingly           3/25-edema actually doing fairly well    Podiatry care with history of recurrent Left foot cellulitis /multiple amputations- he will try to keep this open to the air during the day to help keep this dry between the toes.            He will see his podiatrist, Dr. Thurston every 12 wks., no recent problems with consistent care.  He saw him 4/26/2024      Positive p-ANCA blood test-slightly elevated-additional labs ordered              Follow-up labs nondiagnostic      S/p hospitalization 1/20/2024-1/26/2024 with acute hypoxic respiratory failure associated with RSV infection-now at home this week-clinically improved though he still has a significant cough with postnasal drip.  He did have a fiberoptic bronchoscopy the day of discharge. oxygen saturations at home typically in the 94 to 95% though a bit lower this morning.  His wife has been very attentive to his pulmonary and congestion medications.  Additionally he has been on doxycycline.  His albuterol nebulizers initially were 4 times daily now 3 times daily steroid taper and will continue.  She will continue the Mucinex maximum strength DM with ample fluids twice daily    Pulmonology care-he was scheduled to follow-up with Dr. So February 2/18/2024 in pulmonology this month-PFTs and a sleep study have been planned, following his bronchoscopy 1/26/2024 while admitted.    Postnasal drip-he will continue the Flonase twice daily and restart Zyrtec twice daily.    Home health care-             Home health care presently has  been concluded    S/p overnight hospitalization 12/10/2023 with dizziness and bradycardia after a bowel movement-rather than vasovagal this was attributed to metoprolol effects.  Metoprolol has been discontinued and he has no subsequent symptoms.          Early January 2024-Home blood pressure log with systolic values typically between 1 25-1 35 systolic, with pulse in the low 60 range.  He is scheduled to see Dr. Foley in electrocardiology 2/2/2024.  His wife will continue to follow blood pressure and pulse and take that data in with their visit in 3 weeks.    S/p ER visit 7/7/2023 for right leg cellulitis concern-the ER placed him on Keflex.  He saw vascular surgery-stasis congestion suspected-venous ultrasound has been ordered-plan for tomorrow    S/p  hospitalization-with acute stay March 5 through 8, followed by rehab March 8 to 19.  At home again.  He will continue ongoing home exercises and therapy as needed fortunately improved     P. A-Fib/Arrhythmia - cardiologist, Dr. Foley has started metoprolol 25 mg one time a day. No dizziness.  Stable clinically from a rhythm standpoint. He will continue his medications and cardiology follow-up            Stable, saw Dr. Foley  Nov '23.  Anticipate annual follow-up this fall    Anticoagulation-chronic with Eliquis-no complications with this     Pulmonary hypertension - echo April 2022 did show mild to moderate pulmonary hypertension but his cardiologist, did not feel that this was an issue. Otherwise normal EF without worrisome valvular findings on that echo     Bradycardia - discussed with wife in detail-although his heart rate gets into the high 40 range sometime he is not symptomatic. They reviewed this with Dr. Carnes who suggested reducing metoprolol succinate 25-1/2 tablet daily. Pulse rate has increased from the high 40s up into the 50 range. Recently between 55-64          1/24-off metoprolol pulse in the low 60 range     hypertension/chronic kidney  disease-he will continue medications and nephrology follow-up with Dr. Sahu's successor, Dr. Contreras. He'll see her semiannually.             They will follow-up with Dr. Gutierrez semiannually             January 2024-blood pressure and pulse remain improved/stable off low-dose metoprolol.  Labs have been done for the visit with the nephrologist next week             8/24-blood pressure improved              10/24-blood pressure slightly elevated.  Normal creatinine 0.76            4/25-labile blood pressure continues-but with constipation issue he will reduce amlodipine 10 mg down to 5 mg daily and increase losartan from 50 to 100 mg daily        Parkinson's - not problematic, without noticeable tremor with medications per Dr. Peters in neurology- just saw last month; cont. semiannual visits. Overall doing well from this standpoint. To see Dr. Peters semiannually. Tremor not obvious today and exam. Doing well presently. semiannual visits planned. Tolerating medications. No recent concerns stable course.   He will see Dr. Lyle annually -each October. Clinically stable    Multiple medications-patient is on multiple medications. Medication list reviewed with the patient and reconciled and updated in the EMR accordingly. Ongoing efforts to minimize the number of medications medications as well as optimize dosing and compliance continues to be an ongoing priority.          5/24-medication list reviewed       Remote intracranial bleed/right-sided hemiparesis/gait unsteadiness - stable course. He's functionally independent for the most part at home. His wife continues to be extremely supportive accordingly. He remained stable fortunately.  His arms have normal strength, as does his left leg.  His right leg remains partially paralyzed                He will continue caution ambulating and continue exercises and stretches to maintain strength stamina as well as ambulation conditioning. He uses a walker at home typically  fortunately no recent falls. Physical therapy continues on an as-needed basis     Hypothyroidism - we'll continue to follow thyroid labs semi-annually   Sept '22 TSH normal, continue present regimen.           4/24-TSH stable              9/24-TSH ordered     Prediabetes /elevated weight- monitoring on semiannual basis   Jan '22, A1c 6.1%   Sept '22, A1c 6.1%, stable from prior, discussed weight loss efforts today.        4/29/2024-A1c 5.6%, improved from 5.9%.  BMI 29.6          9/24-lipids ordered     Dyslipidemia/elevated weight -goal LDL under 100   Sept 2022 lipids slightly elevated but overall stable. Health lifestyle measures encouraged. He will continue to follow-up with cardiology (Dr. Foley) accordingly.               9/24-lipids ordered    Postnasal drip-he will continue the Flonase and restart Zyrtec     History of Recurrent pneumonia/with hospitalization again with another rehabilitation stay-    Improved presently no shortness of breath. Occas cough. Using incentive spirometer a 3 times daily     Anemia-iron deficiency concern-             Late April 2024-H&H 12.6 39.6-he will call his gastroenterologist to see if an EGD/colonoscopy is needed sooner than scheduled            H&H normal earlier today 10/25/2024    History of acute renal failure-improved in hospital with hydration-creatinine 0.87 4/29/2024.  He follows with Ai Gutierrez in nephrology, in 3/24, and 5,24               Will continue to follow labs     Urinary incontinence/BPH/nephrolithiasis/hypogonadism will continue medications per urology - He follows with Dr. Srivastava in urology--              No recent concerns    Vitamin D deficiency - encouraged patient to continue vitamin D daily as ordered. Will consider vitamin D level regularly.   Sept '22 vitamin D stable/normal.    H. Pylori - 6/24 - s/p treatment, and follow up breath test OK     Colon care/colon polyps -  He had 2 tubular adenomatous polyps on his 6/24 colonoscopy.             Next in 3 years-        Skin care/scalp psoriasis/eczema - per Dr. Amaya annually or as needed. UTD.      Dental care / upper partial - continue every 6 months      Elevated IOP / Vision care/bilateral cataract/glasses -  visits continue. He will see Dr. Riley 2021 to address his bilateral cataracts.           At this point the cataracts are not ready to be removed. They will continue to see her regularly              S/p right laser procedure, left laser eye  in Aug '23 for pressure concerns.            Eye visits UTD semiannually w/ Dr. Riley. Bilat cataracts                   Flu shot - each  after Labor Day encouraged-          Updated 2024.  He will do this in 2025    COVID booster-            Encouraged considering getting the new COVID booster this  when available.      Tdap booster-will be needed this spring          5/24- ordered            -encouraged    RSV vaccination-    Shingrix series-completed     Follow-up in 4 months, sooner as needed-             Charting was completed using voice recognition technology and may include unintended errors.          [1]   Family History  Problem Relation Name Age of Onset    Other (Brain tumor) Mother          mother passes away in 80s    Stroke Father          massive stroke visiting in [place]    Heart failure Father           in his sleep at at 62,    Lung cancer Sister Liseth          at age 49    Hypertension Sister Azra     No Known Problems Sister Izabel     No Known Problems Sister Nallely     No Known Problems Brother

## 2025-08-01 PROBLEM — Z74.2 ASSISTANCE NEEDED AT HOME: Status: ACTIVE | Noted: 2025-08-01

## 2025-08-05 ENCOUNTER — PATIENT OUTREACH (OUTPATIENT)
Dept: PRIMARY CARE | Facility: CLINIC | Age: 70
End: 2025-08-05
Payer: MEDICARE

## 2025-08-05 DIAGNOSIS — Z79.01 CHRONIC ANTICOAGULATION: ICD-10-CM

## 2025-08-05 DIAGNOSIS — I73.9 PVD (PERIPHERAL VASCULAR DISEASE): ICD-10-CM

## 2025-08-05 NOTE — PROGRESS NOTES
Care Management Monthly Outreach  Chart review completed  Confirmation of at least 2 patient identifiers  Change in insurance? No    Has patient been to ER/Urgent Care since last outreach? No    Last Office Visit with PCP: 7/28/2025   Next Office Visit with PCP: Visit date not found   APC Collaboration: n/a    Chronic Conditions and Outreach Summary:   PVD (peripheral vascular disease)    Chronic anticoagulation    Medications:   Are there medication changes since last visit? Yes - started clobetasol  Refills needed? No    Social Drivers of Health: Addressed today  Care Gaps Addressed? Addressed today  Care Plan addressed: Yes    Upcoming Appointments:     Blood Pressures Reviewed  BP Readings from Last 3 Encounters:   07/28/25 128/74   06/17/25 145/71   04/25/25 132/90     Labs Reviewed:  Lab Results   Component Value Date    CREATININE 0.84 11/11/2024    GLUCOSE 112 (H) 11/11/2024    ALKPHOS 67 03/21/2025    K 3.9 11/11/2024    PROT 6.3 03/21/2025     11/11/2024    CALCIUM 9.1 11/11/2024    AST 11 03/21/2025    ALT 6 (L) 03/21/2025    BUN 13 11/11/2024    MG 2.02 11/11/2024    PHOS 3.5 11/11/2024    GFRMALE >90 03/31/2023     Lab Results   Component Value Date    TRIG 105 09/24/2022    CHOL 158 09/24/2022    HDL 35.0 (A) 09/24/2022     Lab Results   Component Value Date    HGBA1C 5.6 04/29/2024    HGBA1C 5.9 (H) 01/05/2024    HGBA1C 6.1 (A) 09/24/2022     Lab Results   Component Value Date    WBC 5.1 11/11/2024    RBC 4.40 (L) 11/11/2024    HGB 13.3 (L) 11/11/2024     11/11/2024   Patient has a bx of the blister on his back.  Expecting the results tomorrow.  There is concern that he may have bullous pemphigoid, and was started on clobetasol.  Agreeable to continue monthly outreaches.  Encouraged to call if questions or concerns arise.    Roseanne Kong RN

## 2025-08-18 ENCOUNTER — TELEPHONE (OUTPATIENT)
Dept: PRIMARY CARE | Facility: CLINIC | Age: 70
End: 2025-08-18
Payer: MEDICARE

## 2025-08-29 ENCOUNTER — TELEPHONE (OUTPATIENT)
Dept: PRIMARY CARE | Facility: CLINIC | Age: 70
End: 2025-08-29
Payer: MEDICARE

## 2025-09-02 ENCOUNTER — TELEPHONE (OUTPATIENT)
Dept: PRIMARY CARE | Facility: CLINIC | Age: 70
End: 2025-09-02
Payer: MEDICARE

## 2025-09-03 ENCOUNTER — TELEPHONE (OUTPATIENT)
Dept: PRIMARY CARE | Facility: CLINIC | Age: 70
End: 2025-09-03
Payer: MEDICARE

## 2025-09-05 ENCOUNTER — PATIENT OUTREACH (OUTPATIENT)
Dept: PRIMARY CARE | Facility: CLINIC | Age: 70
End: 2025-09-05
Payer: MEDICARE

## 2025-09-12 ENCOUNTER — APPOINTMENT (OUTPATIENT)
Dept: PRIMARY CARE | Facility: CLINIC | Age: 70
End: 2025-09-12
Payer: MEDICARE